# Patient Record
Sex: FEMALE | Race: WHITE | Employment: OTHER | URBAN - METROPOLITAN AREA
[De-identification: names, ages, dates, MRNs, and addresses within clinical notes are randomized per-mention and may not be internally consistent; named-entity substitution may affect disease eponyms.]

---

## 2017-01-03 ENCOUNTER — HOSPITAL ENCOUNTER (OUTPATIENT)
Dept: PHYSICAL THERAPY | Age: 77
Setting detail: THERAPIES SERIES
Discharge: HOME | End: 2017-01-03
Payer: MEDICARE

## 2017-01-03 PROCEDURE — 97110 THERAPEUTIC EXERCISES: CPT

## 2017-01-03 ASSESSMENT — PAIN DESCRIPTION - DESCRIPTORS: DESCRIPTORS: ACHING

## 2017-01-03 ASSESSMENT — PAIN DESCRIPTION - LOCATION: LOCATION: SHOULDER

## 2017-01-03 ASSESSMENT — PAIN SCALES - GENERAL: PAINLEVEL_OUTOF10: 2

## 2017-01-03 ASSESSMENT — PAIN DESCRIPTION - ORIENTATION: ORIENTATION: LEFT

## 2017-01-05 ENCOUNTER — HOSPITAL ENCOUNTER (OUTPATIENT)
Dept: PHYSICAL THERAPY | Age: 77
Setting detail: THERAPIES SERIES
Discharge: HOME | End: 2017-01-05
Payer: MEDICARE

## 2017-01-05 PROCEDURE — 97110 THERAPEUTIC EXERCISES: CPT

## 2017-01-10 ENCOUNTER — HOSPITAL ENCOUNTER (OUTPATIENT)
Dept: PHYSICAL THERAPY | Age: 77
Setting detail: THERAPIES SERIES
Discharge: HOME | End: 2017-01-10
Payer: MEDICARE

## 2017-01-12 ENCOUNTER — HOSPITAL ENCOUNTER (OUTPATIENT)
Dept: ORTHOPEDIC SURGERY | Age: 77
Discharge: HOME | End: 2017-01-12
Payer: MEDICARE

## 2017-01-12 DIAGNOSIS — S22.000D COMPRESSION FRACTURE OF THORACIC VERTEBRA, WITH ROUTINE HEALING, SUBSEQUENT ENCOUNTER: ICD-10-CM

## 2017-01-12 PROCEDURE — 72072 X-RAY EXAM THORAC SPINE 3VWS: CPT

## 2017-01-13 ENCOUNTER — HOSPITAL ENCOUNTER (OUTPATIENT)
Dept: PHYSICAL THERAPY | Age: 77
Setting detail: THERAPIES SERIES
Discharge: HOME | End: 2017-01-13
Payer: MEDICARE

## 2017-01-13 PROCEDURE — G8984 CARRY CURRENT STATUS: HCPCS

## 2017-01-13 PROCEDURE — 97110 THERAPEUTIC EXERCISES: CPT

## 2017-01-13 PROCEDURE — G8985 CARRY GOAL STATUS: HCPCS

## 2017-01-13 ASSESSMENT — PAIN DESCRIPTION - PAIN TYPE: TYPE: ACUTE PAIN

## 2017-01-13 ASSESSMENT — PAIN DESCRIPTION - FREQUENCY: FREQUENCY: INTERMITTENT

## 2017-01-13 ASSESSMENT — PAIN DESCRIPTION - DESCRIPTORS: DESCRIPTORS: SORE

## 2017-01-13 ASSESSMENT — PAIN DESCRIPTION - LOCATION: LOCATION: SHOULDER

## 2017-01-13 ASSESSMENT — PAIN DESCRIPTION - ORIENTATION: ORIENTATION: LEFT

## 2017-01-13 ASSESSMENT — PAIN SCALES - GENERAL: PAINLEVEL_OUTOF10: 3

## 2017-01-17 ENCOUNTER — HOSPITAL ENCOUNTER (OUTPATIENT)
Dept: PHYSICAL THERAPY | Age: 77
Setting detail: THERAPIES SERIES
Discharge: HOME | End: 2017-01-17
Payer: MEDICARE

## 2017-01-19 ENCOUNTER — HOSPITAL ENCOUNTER (OUTPATIENT)
Dept: PHYSICAL THERAPY | Age: 77
Setting detail: THERAPIES SERIES
Discharge: HOME | End: 2017-01-19
Payer: MEDICARE

## 2017-01-19 PROCEDURE — 97110 THERAPEUTIC EXERCISES: CPT

## 2017-01-19 PROCEDURE — 97140 MANUAL THERAPY 1/> REGIONS: CPT

## 2017-01-24 ENCOUNTER — HOSPITAL ENCOUNTER (OUTPATIENT)
Dept: PHYSICAL THERAPY | Age: 77
Setting detail: THERAPIES SERIES
Discharge: HOME | End: 2017-01-24
Payer: MEDICARE

## 2017-01-24 PROCEDURE — 97110 THERAPEUTIC EXERCISES: CPT

## 2017-01-24 PROCEDURE — 97140 MANUAL THERAPY 1/> REGIONS: CPT

## 2017-01-26 ENCOUNTER — HOSPITAL ENCOUNTER (OUTPATIENT)
Dept: PHYSICAL THERAPY | Age: 77
Discharge: HOME | End: 2017-01-26

## 2017-01-27 ENCOUNTER — HOSPITAL ENCOUNTER (OUTPATIENT)
Dept: PHYSICAL THERAPY | Age: 77
Setting detail: THERAPIES SERIES
Discharge: HOME | End: 2017-01-27
Payer: MEDICARE

## 2017-01-27 PROCEDURE — 97110 THERAPEUTIC EXERCISES: CPT

## 2017-01-27 PROCEDURE — 97140 MANUAL THERAPY 1/> REGIONS: CPT

## 2017-01-31 ENCOUNTER — HOSPITAL ENCOUNTER (OUTPATIENT)
Dept: PHYSICAL THERAPY | Age: 77
Discharge: HOME | End: 2017-01-31

## 2017-02-02 ENCOUNTER — HOSPITAL ENCOUNTER (OUTPATIENT)
Dept: PHYSICAL THERAPY | Age: 77
Setting detail: THERAPIES SERIES
Discharge: HOME | End: 2017-02-02
Payer: MEDICARE

## 2017-02-02 PROCEDURE — 97140 MANUAL THERAPY 1/> REGIONS: CPT

## 2017-02-02 PROCEDURE — 97110 THERAPEUTIC EXERCISES: CPT

## 2017-02-07 ENCOUNTER — HOSPITAL ENCOUNTER (OUTPATIENT)
Dept: PHYSICAL THERAPY | Age: 77
Setting detail: THERAPIES SERIES
Discharge: HOME | End: 2017-02-07
Payer: MEDICARE

## 2017-02-07 PROCEDURE — 97110 THERAPEUTIC EXERCISES: CPT

## 2017-02-07 PROCEDURE — 97140 MANUAL THERAPY 1/> REGIONS: CPT

## 2017-02-14 ENCOUNTER — HOSPITAL ENCOUNTER (OUTPATIENT)
Dept: PHYSICAL THERAPY | Age: 77
Discharge: HOME | End: 2017-02-14

## 2017-02-16 ENCOUNTER — HOSPITAL ENCOUNTER (OUTPATIENT)
Dept: PHYSICAL THERAPY | Age: 77
Setting detail: THERAPIES SERIES
Discharge: HOME | End: 2017-02-16
Payer: MEDICARE

## 2017-02-16 PROCEDURE — 97110 THERAPEUTIC EXERCISES: CPT

## 2017-02-16 PROCEDURE — 97140 MANUAL THERAPY 1/> REGIONS: CPT

## 2017-02-21 ENCOUNTER — HOSPITAL ENCOUNTER (OUTPATIENT)
Dept: PHYSICAL THERAPY | Age: 77
Setting detail: THERAPIES SERIES
Discharge: HOME | End: 2017-02-21
Payer: MEDICARE

## 2017-02-21 PROCEDURE — 97110 THERAPEUTIC EXERCISES: CPT

## 2017-02-23 ENCOUNTER — HOSPITAL ENCOUNTER (OUTPATIENT)
Dept: PHYSICAL THERAPY | Age: 77
Setting detail: THERAPIES SERIES
Discharge: HOME | End: 2017-02-23
Payer: MEDICARE

## 2017-02-23 PROCEDURE — 97110 THERAPEUTIC EXERCISES: CPT

## 2017-02-28 ENCOUNTER — HOSPITAL ENCOUNTER (OUTPATIENT)
Dept: PHYSICAL THERAPY | Age: 77
Setting detail: THERAPIES SERIES
Discharge: HOME | End: 2017-02-28
Payer: MEDICARE

## 2017-03-02 ENCOUNTER — HOSPITAL ENCOUNTER (OUTPATIENT)
Dept: PHYSICAL THERAPY | Age: 77
Setting detail: THERAPIES SERIES
Discharge: HOME | End: 2017-03-02
Payer: MEDICARE

## 2017-03-02 PROCEDURE — 97140 MANUAL THERAPY 1/> REGIONS: CPT

## 2017-03-02 PROCEDURE — 97110 THERAPEUTIC EXERCISES: CPT

## 2017-03-02 ASSESSMENT — PAIN DESCRIPTION - DESCRIPTORS: DESCRIPTORS: PRESSURE

## 2017-03-02 ASSESSMENT — PAIN DESCRIPTION - LOCATION: LOCATION: SHOULDER

## 2017-03-02 ASSESSMENT — PAIN DESCRIPTION - ORIENTATION: ORIENTATION: LEFT

## 2017-03-02 ASSESSMENT — PAIN SCALES - GENERAL: PAINLEVEL_OUTOF10: 4

## 2017-05-08 ENCOUNTER — APPOINTMENT (OUTPATIENT)
Dept: GENERAL RADIOLOGY | Age: 77
End: 2017-05-08
Payer: MEDICARE

## 2017-05-08 ENCOUNTER — HOSPITAL ENCOUNTER (EMERGENCY)
Age: 77
Discharge: HOME | End: 2017-05-08
Attending: EMERGENCY MEDICINE
Payer: MEDICARE

## 2017-05-08 VITALS
RESPIRATION RATE: 16 BRPM | TEMPERATURE: 97.8 F | OXYGEN SATURATION: 98 % | HEART RATE: 66 BPM | DIASTOLIC BLOOD PRESSURE: 78 MMHG | SYSTOLIC BLOOD PRESSURE: 168 MMHG | BODY MASS INDEX: 22.62 KG/M2 | WEIGHT: 112 LBS

## 2017-05-08 DIAGNOSIS — S16.1XXA NECK STRAIN, INITIAL ENCOUNTER: Primary | ICD-10-CM

## 2017-05-08 PROCEDURE — 99283 EMERGENCY DEPT VISIT LOW MDM: CPT

## 2017-05-08 PROCEDURE — 6370000000 HC RX 637 (ALT 250 FOR IP): Performed by: EMERGENCY MEDICINE

## 2017-05-08 PROCEDURE — 72072 X-RAY EXAM THORAC SPINE 3VWS: CPT

## 2017-05-08 PROCEDURE — 72050 X-RAY EXAM NECK SPINE 4/5VWS: CPT

## 2017-05-08 RX ORDER — ACETAMINOPHEN 500 MG
1000 TABLET ORAL ONCE
Status: COMPLETED | OUTPATIENT
Start: 2017-05-08 | End: 2017-05-08

## 2017-05-08 RX ADMIN — ACETAMINOPHEN 1000 MG: 500 TABLET ORAL at 16:57

## 2017-05-08 ASSESSMENT — PAIN SCALES - GENERAL
PAINLEVEL_OUTOF10: 9
PAINLEVEL_OUTOF10: 9
PAINLEVEL_OUTOF10: 5

## 2017-05-08 ASSESSMENT — ENCOUNTER SYMPTOMS
SHORTNESS OF BREATH: 0
VOMITING: 0
RHINORRHEA: 0
ABDOMINAL DISTENTION: 0
FACIAL SWELLING: 0
PHOTOPHOBIA: 0
BACK PAIN: 1
EYE DISCHARGE: 0
WHEEZING: 0
ABDOMINAL PAIN: 0
COLOR CHANGE: 0

## 2017-05-08 ASSESSMENT — PAIN DESCRIPTION - ONSET: ONSET: ON-GOING

## 2017-05-08 ASSESSMENT — PAIN DESCRIPTION - DESCRIPTORS
DESCRIPTORS: ACHING
DESCRIPTORS: ACHING

## 2017-05-08 ASSESSMENT — PAIN DESCRIPTION - PAIN TYPE
TYPE: ACUTE PAIN
TYPE: ACUTE PAIN

## 2017-05-08 ASSESSMENT — PAIN DESCRIPTION - ORIENTATION
ORIENTATION: LEFT
ORIENTATION: LEFT

## 2017-05-08 ASSESSMENT — PAIN DESCRIPTION - PROGRESSION: CLINICAL_PROGRESSION: GRADUALLY IMPROVING

## 2017-05-08 ASSESSMENT — PAIN DESCRIPTION - FREQUENCY: FREQUENCY: INTERMITTENT

## 2017-05-08 ASSESSMENT — PAIN DESCRIPTION - LOCATION
LOCATION: NECK
LOCATION: NECK

## 2017-08-30 ENCOUNTER — HOSPITAL ENCOUNTER (OUTPATIENT)
Dept: GENERAL RADIOLOGY | Age: 77
Discharge: HOME | End: 2017-08-30
Payer: MEDICARE

## 2017-08-30 DIAGNOSIS — R52 PAIN: ICD-10-CM

## 2017-08-30 PROCEDURE — 74230 X-RAY XM SWLNG FUNCJ C+: CPT

## 2017-08-30 PROCEDURE — 2500000003 HC RX 250 WO HCPCS: Performed by: RADIOLOGY

## 2017-08-30 RX ADMIN — BARIUM SULFATE 40 ML: 0.81 POWDER, FOR SUSPENSION ORAL at 14:00

## 2017-08-30 RX ADMIN — BARIUM SULFATE 80 ML: 400 SUSPENSION ORAL at 14:00

## 2018-11-10 ENCOUNTER — APPOINTMENT (OUTPATIENT)
Dept: CT IMAGING | Age: 78
DRG: 552 | End: 2018-11-10
Payer: MEDICARE

## 2018-11-10 ENCOUNTER — HOSPITAL ENCOUNTER (INPATIENT)
Age: 78
LOS: 2 days | Discharge: NURSING, REHAB OR LONG-TERM CARE | DRG: 552 | End: 2018-11-14
Attending: EMERGENCY MEDICINE | Admitting: INTERNAL MEDICINE
Payer: MEDICARE

## 2018-11-10 ENCOUNTER — ANESTHESIA EVENT (OUTPATIENT)
Dept: EMERGENCY DEPT | Age: 78
DRG: 552 | End: 2018-11-10
Payer: MEDICARE

## 2018-11-10 ENCOUNTER — ANESTHESIA (OUTPATIENT)
Dept: EMERGENCY DEPT | Age: 78
DRG: 552 | End: 2018-11-10
Payer: MEDICARE

## 2018-11-10 VITALS — DIASTOLIC BLOOD PRESSURE: 91 MMHG | SYSTOLIC BLOOD PRESSURE: 160 MMHG

## 2018-11-10 DIAGNOSIS — G44.89 OTHER HEADACHE SYNDROME: Primary | ICD-10-CM

## 2018-11-10 DIAGNOSIS — I10 ESSENTIAL HYPERTENSION: ICD-10-CM

## 2018-11-10 PROBLEM — R51.9 HA (HEADACHE): Status: ACTIVE | Noted: 2018-11-10

## 2018-11-10 LAB
ALBUMIN SERPL-MCNC: 4.3 G/DL (ref 3.9–4.9)
ALP BLD-CCNC: 96 U/L (ref 40–130)
ALT SERPL-CCNC: <5 U/L (ref 0–33)
ANION GAP SERPL CALCULATED.3IONS-SCNC: 8 MEQ/L (ref 7–13)
APPEARANCE CSF: CLEAR
AST SERPL-CCNC: 26 U/L (ref 0–35)
BACTERIA: NORMAL /HPF
BASOPHILS ABSOLUTE: 0 K/UL (ref 0–0.2)
BASOPHILS RELATIVE PERCENT: 0.6 %
BILIRUB SERPL-MCNC: 0.5 MG/DL (ref 0–1.2)
BILIRUBIN URINE: NEGATIVE
BLOOD, URINE: NEGATIVE
BUN BLDV-MCNC: 23 MG/DL (ref 8–23)
CALCIUM SERPL-MCNC: 9.8 MG/DL (ref 8.6–10.2)
CHLORIDE BLD-SCNC: 105 MEQ/L (ref 98–107)
CLARITY: CLEAR
CLOT EVALUATION CSF: NORMAL
CO2: 30 MEQ/L (ref 22–29)
COLOR CSF: COLORLESS
COLOR: YELLOW
CREAT SERPL-MCNC: 0.61 MG/DL (ref 0.5–0.9)
EKG ATRIAL RATE: 51 BPM
EKG Q-T INTERVAL: 444 MS
EKG QRS DURATION: 84 MS
EKG QTC CALCULATION (BAZETT): 404 MS
EKG R AXIS: -40 DEGREES
EKG T AXIS: 47 DEGREES
EKG VENTRICULAR RATE: 50 BPM
EOSINOPHILS ABSOLUTE: 0 K/UL (ref 0–0.7)
EOSINOPHILS RELATIVE PERCENT: 0.6 %
EPITHELIAL CELLS, UA: NORMAL /HPF
GFR AFRICAN AMERICAN: >60
GFR NON-AFRICAN AMERICAN: >60
GLOBULIN: 2.5 G/DL (ref 2.3–3.5)
GLUCOSE BLD-MCNC: 91 MG/DL (ref 74–109)
GLUCOSE URINE: NEGATIVE MG/DL
GLUCOSE, CSF: 58 MG/DL (ref 50–70)
HCT VFR BLD CALC: 40.6 % (ref 37–47)
HEMOGLOBIN: 14.3 G/DL (ref 12–16)
KETONES, URINE: NEGATIVE MG/DL
LEUKOCYTE ESTERASE, URINE: ABNORMAL
LYMPHOCYTES ABSOLUTE: 0.9 K/UL (ref 1–4.8)
LYMPHOCYTES RELATIVE PERCENT: 13.6 %
MCH RBC QN AUTO: 31.8 PG (ref 27–31.3)
MCHC RBC AUTO-ENTMCNC: 35.3 % (ref 33–37)
MCV RBC AUTO: 90.3 FL (ref 82–100)
MONOCYTES ABSOLUTE: 0.6 K/UL (ref 0.2–0.8)
MONOCYTES RELATIVE PERCENT: 8.9 %
NEUTROPHILS ABSOLUTE: 5.3 K/UL (ref 1.4–6.5)
NEUTROPHILS RELATIVE PERCENT: 76.3 %
NITRITE, URINE: NEGATIVE
NO DIFFERENTIAL CSF: NORMAL
PDW BLD-RTO: 13.6 % (ref 11.5–14.5)
PH UA: 6 (ref 5–9)
PLATELET # BLD: 114 K/UL (ref 130–400)
POTASSIUM SERPL-SCNC: 4.3 MEQ/L (ref 3.5–5.1)
PROTEIN CSF: 32 MG/DL (ref 15–45)
PROTEIN UA: NEGATIVE MG/DL
RBC # BLD: 4.5 M/UL (ref 4.2–5.4)
RBC CSF: 0 K/UL
RBC UA: NORMAL /HPF (ref 0–2)
SODIUM BLD-SCNC: 143 MEQ/L (ref 132–144)
SPECIFIC GRAVITY UA: 1.01 (ref 1–1.03)
TOTAL PROTEIN: 6.8 G/DL (ref 6.4–8.1)
TROPONIN: <0.01 NG/ML (ref 0–0.01)
TUBE NUMBER CSF: NORMAL
URINE REFLEX TO CULTURE: YES
UROBILINOGEN, URINE: 0.2 E.U./DL
VOLUME CSF: 2 ML
WBC # BLD: 7 K/UL (ref 4.8–10.8)
WBC CSF: 0 K/UL (ref 0–8)
WBC UA: NORMAL /HPF (ref 0–5)

## 2018-11-10 PROCEDURE — G0378 HOSPITAL OBSERVATION PER HR: HCPCS

## 2018-11-10 PROCEDURE — 6370000000 HC RX 637 (ALT 250 FOR IP): Performed by: PHYSICIAN ASSISTANT

## 2018-11-10 PROCEDURE — 2580000003 HC RX 258: Performed by: EMERGENCY MEDICINE

## 2018-11-10 PROCEDURE — 87086 URINE CULTURE/COLONY COUNT: CPT

## 2018-11-10 PROCEDURE — 2580000003 HC RX 258: Performed by: PHYSICIAN ASSISTANT

## 2018-11-10 PROCEDURE — 85025 COMPLETE CBC W/AUTO DIFF WBC: CPT

## 2018-11-10 PROCEDURE — 62270 DX LMBR SPI PNXR: CPT

## 2018-11-10 PROCEDURE — 6360000002 HC RX W HCPCS: Performed by: EMERGENCY MEDICINE

## 2018-11-10 PROCEDURE — 99285 EMERGENCY DEPT VISIT HI MDM: CPT

## 2018-11-10 PROCEDURE — 87205 SMEAR GRAM STAIN: CPT

## 2018-11-10 PROCEDURE — 6360000002 HC RX W HCPCS: Performed by: PHYSICIAN ASSISTANT

## 2018-11-10 PROCEDURE — 89050 BODY FLUID CELL COUNT: CPT

## 2018-11-10 PROCEDURE — 6370000000 HC RX 637 (ALT 250 FOR IP): Performed by: EMERGENCY MEDICINE

## 2018-11-10 PROCEDURE — 70450 CT HEAD/BRAIN W/O DYE: CPT

## 2018-11-10 PROCEDURE — 36415 COLL VENOUS BLD VENIPUNCTURE: CPT

## 2018-11-10 PROCEDURE — 93005 ELECTROCARDIOGRAM TRACING: CPT

## 2018-11-10 PROCEDURE — 009U3ZX DRAINAGE OF SPINAL CANAL, PERCUTANEOUS APPROACH, DIAGNOSTIC: ICD-10-PCS | Performed by: EMERGENCY MEDICINE

## 2018-11-10 PROCEDURE — 84157 ASSAY OF PROTEIN OTHER: CPT

## 2018-11-10 PROCEDURE — 82945 GLUCOSE OTHER FLUID: CPT

## 2018-11-10 PROCEDURE — 81001 URINALYSIS AUTO W/SCOPE: CPT

## 2018-11-10 PROCEDURE — 3700000000 HC ANESTHESIA ATTENDED CARE

## 2018-11-10 PROCEDURE — 96374 THER/PROPH/DIAG INJ IV PUSH: CPT

## 2018-11-10 PROCEDURE — 84484 ASSAY OF TROPONIN QUANT: CPT

## 2018-11-10 PROCEDURE — 80053 COMPREHEN METABOLIC PANEL: CPT

## 2018-11-10 PROCEDURE — 87070 CULTURE OTHR SPECIMN AEROBIC: CPT

## 2018-11-10 RX ORDER — FOLIC ACID 1 MG/1
1 TABLET ORAL DAILY
COMMUNITY

## 2018-11-10 RX ORDER — CLONIDINE HYDROCHLORIDE 0.1 MG/1
0.2 TABLET ORAL ONCE
Status: COMPLETED | OUTPATIENT
Start: 2018-11-10 | End: 2018-11-10

## 2018-11-10 RX ORDER — SODIUM CHLORIDE 0.9 % (FLUSH) 0.9 %
10 SYRINGE (ML) INJECTION EVERY 12 HOURS SCHEDULED
Status: DISCONTINUED | OUTPATIENT
Start: 2018-11-10 | End: 2018-11-14 | Stop reason: HOSPADM

## 2018-11-10 RX ORDER — ATENOLOL 25 MG/1
25 TABLET ORAL DAILY
Status: DISCONTINUED | OUTPATIENT
Start: 2018-11-11 | End: 2018-11-11

## 2018-11-10 RX ORDER — LEVOTHYROXINE SODIUM 0.05 MG/1
50 TABLET ORAL DAILY
Status: DISCONTINUED | OUTPATIENT
Start: 2018-11-11 | End: 2018-11-13

## 2018-11-10 RX ORDER — ACETAMINOPHEN 325 MG/1
650 TABLET ORAL EVERY 4 HOURS PRN
Status: DISCONTINUED | OUTPATIENT
Start: 2018-11-10 | End: 2018-11-14 | Stop reason: HOSPADM

## 2018-11-10 RX ORDER — HYDRALAZINE HYDROCHLORIDE 20 MG/ML
5 INJECTION INTRAMUSCULAR; INTRAVENOUS EVERY 6 HOURS PRN
Status: DISCONTINUED | OUTPATIENT
Start: 2018-11-10 | End: 2018-11-14 | Stop reason: HOSPADM

## 2018-11-10 RX ORDER — 0.9 % SODIUM CHLORIDE 0.9 %
500 INTRAVENOUS SOLUTION INTRAVENOUS ONCE
Status: COMPLETED | OUTPATIENT
Start: 2018-11-10 | End: 2018-11-10

## 2018-11-10 RX ORDER — CYCLOBENZAPRINE HCL 5 MG
2.5 TABLET ORAL ONCE
Status: COMPLETED | OUTPATIENT
Start: 2018-11-10 | End: 2018-11-10

## 2018-11-10 RX ORDER — CYCLOBENZAPRINE HCL 5 MG
2.5 TABLET ORAL 3 TIMES DAILY PRN
Status: DISCONTINUED | OUTPATIENT
Start: 2018-11-10 | End: 2018-11-10

## 2018-11-10 RX ORDER — ACETAMINOPHEN 325 MG/1
650 TABLET ORAL EVERY 4 HOURS PRN
Status: DISCONTINUED | OUTPATIENT
Start: 2018-11-10 | End: 2018-11-10

## 2018-11-10 RX ORDER — FOLIC ACID 1 MG/1
1 TABLET ORAL DAILY
Status: DISCONTINUED | OUTPATIENT
Start: 2018-11-11 | End: 2018-11-13

## 2018-11-10 RX ORDER — ONDANSETRON 2 MG/ML
4 INJECTION INTRAMUSCULAR; INTRAVENOUS EVERY 6 HOURS PRN
Status: DISCONTINUED | OUTPATIENT
Start: 2018-11-10 | End: 2018-11-14 | Stop reason: HOSPADM

## 2018-11-10 RX ORDER — SODIUM CHLORIDE 0.9 % (FLUSH) 0.9 %
10 SYRINGE (ML) INJECTION PRN
Status: DISCONTINUED | OUTPATIENT
Start: 2018-11-10 | End: 2018-11-14 | Stop reason: HOSPADM

## 2018-11-10 RX ORDER — LIDOCAINE 4 G/G
2 PATCH TOPICAL DAILY
Status: DISCONTINUED | OUTPATIENT
Start: 2018-11-11 | End: 2018-11-10

## 2018-11-10 RX ORDER — ATROPINE SULFATE 0.1 MG/ML
0.5 INJECTION INTRAVENOUS ONCE
Status: COMPLETED | OUTPATIENT
Start: 2018-11-10 | End: 2018-11-10

## 2018-11-10 RX ORDER — KETOROLAC TROMETHAMINE 15 MG/ML
15 INJECTION, SOLUTION INTRAMUSCULAR; INTRAVENOUS ONCE
Status: COMPLETED | OUTPATIENT
Start: 2018-11-10 | End: 2018-11-10

## 2018-11-10 RX ORDER — LIDOCAINE 4 G/G
2 PATCH TOPICAL DAILY
Status: DISCONTINUED | OUTPATIENT
Start: 2018-11-10 | End: 2018-11-14 | Stop reason: HOSPADM

## 2018-11-10 RX ORDER — KETOROLAC TROMETHAMINE 15 MG/ML
15 INJECTION, SOLUTION INTRAMUSCULAR; INTRAVENOUS EVERY 6 HOURS PRN
Status: DISCONTINUED | OUTPATIENT
Start: 2018-11-10 | End: 2018-11-14 | Stop reason: HOSPADM

## 2018-11-10 RX ORDER — AMLODIPINE BESYLATE 5 MG/1
5 TABLET ORAL DAILY
Status: DISCONTINUED | OUTPATIENT
Start: 2018-11-11 | End: 2018-11-13

## 2018-11-10 RX ADMIN — SODIUM CHLORIDE 500 ML: 9 INJECTION, SOLUTION INTRAVENOUS at 18:45

## 2018-11-10 RX ADMIN — ACETAMINOPHEN 650 MG: 325 TABLET ORAL at 20:50

## 2018-11-10 RX ADMIN — CLONIDINE HYDROCHLORIDE 0.2 MG: 0.1 TABLET ORAL at 15:36

## 2018-11-10 RX ADMIN — Medication 10 ML: at 21:35

## 2018-11-10 RX ADMIN — CYCLOBENZAPRINE HYDROCHLORIDE 2.5 MG: 5 TABLET, FILM COATED ORAL at 21:33

## 2018-11-10 RX ADMIN — KETOROLAC TROMETHAMINE 15 MG: 15 INJECTION INTRAMUSCULAR; INTRAVENOUS at 21:33

## 2018-11-10 RX ADMIN — ATROPINE SULFATE 0.5 MG: 0.1 INJECTION, SOLUTION ENDOTRACHEAL; INTRAMUSCULAR; INTRAVENOUS; SUBCUTANEOUS at 18:45

## 2018-11-10 RX ADMIN — CARBIDOPA AND LEVODOPA 1 TABLET: 25; 100 TABLET ORAL at 22:26

## 2018-11-10 ASSESSMENT — ENCOUNTER SYMPTOMS
VOICE CHANGE: 0
NAUSEA: 0
CHEST TIGHTNESS: 0
SINUS PRESSURE: 0
SHORTNESS OF BREATH: 0
EYE REDNESS: 0
DIARRHEA: 0
COUGH: 0
STRIDOR: 0
EYE DISCHARGE: 0
PHOTOPHOBIA: 0
CHOKING: 0
EYE PAIN: 0
EYE ITCHING: 0
ABDOMINAL DISTENTION: 0
ABDOMINAL PAIN: 0
ANAL BLEEDING: 0
CONSTIPATION: 0
BLOOD IN STOOL: 0
FACIAL SWELLING: 0
COLOR CHANGE: 0
TROUBLE SWALLOWING: 0
RHINORRHEA: 0
VOMITING: 0
SORE THROAT: 0
WHEEZING: 0
BACK PAIN: 0

## 2018-11-10 ASSESSMENT — PAIN DESCRIPTION - PAIN TYPE: TYPE: ACUTE PAIN

## 2018-11-10 ASSESSMENT — PAIN DESCRIPTION - LOCATION
LOCATION: HEAD;NECK;GENERALIZED
LOCATION: HEAD

## 2018-11-10 ASSESSMENT — PAIN DESCRIPTION - FREQUENCY
FREQUENCY: CONTINUOUS
FREQUENCY: CONTINUOUS

## 2018-11-10 ASSESSMENT — PAIN DESCRIPTION - DESCRIPTORS
DESCRIPTORS: HEADACHE
DESCRIPTORS: DISCOMFORT

## 2018-11-10 ASSESSMENT — PAIN SCALES - GENERAL
PAINLEVEL_OUTOF10: 7
PAINLEVEL_OUTOF10: 7
PAINLEVEL_OUTOF10: 10

## 2018-11-10 ASSESSMENT — PAIN DESCRIPTION - PROGRESSION: CLINICAL_PROGRESSION: NOT CHANGED

## 2018-11-10 ASSESSMENT — PAIN DESCRIPTION - DIRECTION: RADIATING_TOWARDS: DOWN NECK

## 2018-11-10 ASSESSMENT — PAIN DESCRIPTION - ONSET: ONSET: ON-GOING

## 2018-11-10 NOTE — ANESTHESIA PRE PROCEDURE
Problem List:  There is no problem list on file for this patient. Past Medical History:        Diagnosis Date    Closed left arm fracture 09/23/2016    COPD (chronic obstructive pulmonary disease) (HCC)     Falls frequently     GERD (gastroesophageal reflux disease)     Hypertension     Mitral valve prolapse     Thyroid disease        Past Surgical History:        Procedure Laterality Date    APPENDECTOMY      CHOLECYSTECTOMY      HYSTERECTOMY      TONSILLECTOMY      UPPER GASTROINTESTINAL ENDOSCOPY  2/11/16     DR. MEZA        Social History:    Social History   Substance Use Topics    Smoking status: Former Smoker     Quit date: 10/1/1992    Smokeless tobacco: Not on file    Alcohol use Not on file                                Counseling given: Not Answered      Vital Signs (Current):   Vitals:    11/10/18 1531 11/10/18 1700 11/10/18 1743 11/10/18 1758   BP: (!) 184/79  (!) 122/55 (!) 163/60   Pulse: 50  (!) 46 (!) 49   Resp: 16  15 18   Temp:       TempSrc:       SpO2: 98% 96% 98% 98%   Weight:                                                  BP Readings from Last 3 Encounters:   11/10/18 (!) 163/60   11/10/18 (!) 160/91   05/08/17 (!) 168/78       NPO Status:                                                                                 BMI:   Wt Readings from Last 3 Encounters:   11/10/18 100 lb (45.4 kg)   05/08/17 112 lb (50.8 kg)   10/01/16 105 lb (47.6 kg)     Body mass index is 20.2 kg/m².     CBC:   Lab Results   Component Value Date    WBC 7.0 11/10/2018    RBC 4.50 11/10/2018    RBC 4.80 01/07/2012    HGB 14.3 11/10/2018    HCT 40.6 11/10/2018    MCV 90.3 11/10/2018    RDW 13.6 11/10/2018     11/10/2018       CMP:   Lab Results   Component Value Date     11/10/2018    K 4.3 11/10/2018     11/10/2018    CO2 30 11/10/2018    BUN 23 11/10/2018    CREATININE 0.61 11/10/2018    GFRAA >60.0 11/10/2018    LABGLOM >60.0 11/10/2018    GLUCOSE 91 11/10/2018

## 2018-11-10 NOTE — ANESTHESIA PROCEDURE NOTES
Spinal Block    Patient location during procedure: OB  Start time: 11/10/2018 6:06 PM  End time: 11/10/2018 6:10 PM  Reason for block: at surgeon's request  Staffing  Resident/CRNA: German Childers  Performed: resident/CRNA   Preanesthetic Checklist  Completed: patient identified, site marked, surgical consent, pre-op evaluation, timeout performed, IV checked, risks and benefits discussed, monitors and equipment checked, anesthesia consent given, oxygen available and patient being monitored  Spinal Block  Patient position: sitting  Prep: Betadine, site prepped and draped and x3  Patient monitoring: cardiac monitor, continuous pulse ox and frequent blood pressure checks  Approach: midline  Location: L2/L3  Provider prep: mask and sterile gloves  Local infiltration: lidocaine  Needle  Needle type: Jonny   Needle gauge: 22 G  Needle length: 3.5 in  Needle insertion depth: 6 cm  Kit: Dural puncture kit  Assessment  Swirl obtained: Yes  CSF: clear  Attempts: 1  Hemodynamics: stable  Additional Notes  Lumbar puncture in ED for headache. CSF accessed with #22 jonny. Approx 9cc total CSF collected in three tubes.  Patient tolerated procedure very well and is resting comfortably in rm 23 with family present at bedside

## 2018-11-10 NOTE — ED NOTES
Pt HR dropped down to 39. Pt placed on pacer pads and monitor. No s/sym of distress. No further questions or concern from PT. IV line infiltrated and another line placed. Call light in reach. Will con't to monitor. Family at bedside.       Collins Chin, CRISTY  11/10/18 [id number]

## 2018-11-10 NOTE — ED PROVIDER NOTES
[address] [facility] ED  eMERGENCY dEPARTMENT eNCOUnter      Pt Name: Sean Antonio  MRN: [id number]  [place] 1940  Date of evaluation: 11/10/2018  Provider: Natalie Rubinstein, MD    CHIEF COMPLAINT       Chief Complaint   Patient presents with    Headache     neck pains-since yest-denies injury         HISTORY OF PRESENT ILLNESS   (Location/Symptom, Timing/Onset, Context/Setting, Quality, Duration, Modifying Factors, Severity)  Note limiting factors. Sean Antonio is a 70 y.o. female who presents to the emergency department With the worst headache of her life which radiates down both sides of her neck posteriorly. The onset of the symptoms were yesterday. Context and setting: This happened at home. Quality pain: Sharp. She states the pain is all over her head but primarily in the occiput. Modifying factors the patient denies nausea vomiting diarrhea or any other associated symptoms. Patient's had no prior treatment for this condition. Thing makes the headache better or worse. The patient has difficulty moving her neck because of the pain. Severity moderate to severe. HPI    Nursing Notes were reviewed. Review of Systems   Constitutional: Negative for appetite change, chills, diaphoresis, fatigue and fever. HENT: Negative for congestion, dental problem, ear discharge, ear pain, facial swelling, hearing loss, mouth sores, nosebleeds, postnasal drip, rhinorrhea, sinus pressure, sneezing, sore throat, tinnitus, trouble swallowing and voice change. Eyes: Negative for photophobia, pain, discharge, redness, itching and visual disturbance. Respiratory: Negative for cough, choking, chest tightness, shortness of breath, wheezing and stridor. Cardiovascular: Negative for chest pain, palpitations and leg swelling. Gastrointestinal: Negative for abdominal distention, abdominal pain, anal bleeding, blood in stool, constipation, diarrhea, nausea and vomiting.    Endocrine: Negative for cold intolerance, heat intolerance, polydipsia and polyphagia. Genitourinary: Negative for decreased urine volume, difficulty urinating, dysuria, enuresis, flank pain, frequency, genital sores, hematuria and urgency. Musculoskeletal: Positive for neck pain and neck stiffness. Negative for arthralgias, back pain, gait problem, joint swelling and myalgias. Skin: Negative for color change, pallor, rash and wound. Allergic/Immunologic: Negative for environmental allergies and food allergies. Neurological: Positive for headaches. Negative for dizziness, tremors, seizures, syncope, facial asymmetry, speech difficulty, weakness, light-headedness and numbness. Hematological: Negative for adenopathy. Does not bruise/bleed easily. Psychiatric/Behavioral: Negative for agitation, behavioral problems, confusion, decreased concentration, dysphoric mood, hallucinations, self-injury, sleep disturbance and suicidal ideas. The patient is not nervous/anxious and is not hyperactive. REVIEW OF SYSTEMS    (2-9 systems for level 4, 10 or more for level 5)     Physical Exam   Constitutional: She is oriented to person, place, and time. She appears well-developed and well-nourished. No distress. HENT:   Head: Normocephalic and atraumatic. Right Ear: External ear normal.   Left Ear: External ear normal.   Nose: Nose normal.   Mouth/Throat: Oropharynx is clear and moist. No oropharyngeal exudate. Eyes: Pupils are equal, round, and reactive to light. Conjunctivae and EOM are normal. Right eye exhibits no discharge. Left eye exhibits no discharge. No scleral icterus. Neck: Neck supple. No JVD present. No tracheal deviation present. No thyromegaly present. Patient has limited range of motion in all directions. Cardiovascular: Normal rate, regular rhythm, normal heart sounds and intact distal pulses. Exam reveals no gallop and no friction rub. No murmur heard.   Pulmonary/Chest: Effort normal and breath sounds normal. No stridor. No respiratory distress. She has no wheezes. She has no rales. She exhibits no tenderness. Abdominal: Soft. Bowel sounds are normal. She exhibits no distension and no mass. There is no tenderness. There is no rebound and no guarding. Musculoskeletal: Normal range of motion. She exhibits no edema or tenderness. Patient has significantly decreased range of motion of her neck. All other limbs were palpated and are completely nontender the patient has no pedal edema. Lymphadenopathy:     She has no cervical adenopathy. Neurological: She is alert and oriented to person, place, and time. She has normal reflexes. She displays normal reflexes. No cranial nerve deficit. She exhibits normal muscle tone. Coordination normal.   Skin: Skin is warm and dry. No rash noted. She is not diaphoretic. No erythema. No pallor. Psychiatric: She has a normal mood and affect. Her behavior is normal. Judgment and thought content normal.   Nursing note and vitals reviewed. Except as noted above the remainder of the review of systems was reviewed and negative. PAST MEDICAL HISTORY     Past Medical History:   Diagnosis Date    Closed left arm fracture 09/23/2016    COPD (chronic obstructive pulmonary disease) ([facility] [address]     Falls frequently     GERD (gastroesophageal reflux disease)     Hypertension     Mitral valve prolapse     Thyroid disease          SURGICAL HISTORY       Past Surgical History:   Procedure Laterality Date    APPENDECTOMY      CHOLECYSTECTOMY      HYSTERECTOMY      TONSILLECTOMY      UPPER GASTROINTESTINAL ENDOSCOPY  2/11/16     DR. MEZA          CURRENT MEDICATIONS       Previous Medications    ALBUTEROL SULFATE  (90 BASE) MCG/ACT INHALER    Inhale 2 puffs into the lungs every 6 hours as needed for Wheezing    AMLODIPINE (NORVASC) 5 MG TABLET    Take 5 mg by mouth daily    ATENOLOL (TENORMIN) 25 MG TABLET    Take 25 mg by mouth daily    CEPHALEXIN (KEFLEX) 500 MG CAPSULE    Take 1 capsule by mouth 4 times daily    LEVOTHYROXINE (SYNTHROID) 50 MCG TABLET    Take 50 mcg by mouth Daily    TRAMADOL (ULTRAM) 50 MG TABLET    Take 1 tablet by mouth every 8 hours as needed for Pain       ALLERGIES     Actonel [risedronate sodium]; Carafate [sucralfate]; Codeine; Darvon [propoxyphene]; Estrogens; Ranitidine; and Reglan [metoclopramide]    FAMILY HISTORY     History reviewed. No pertinent family history. SOCIAL HISTORY       Social History     Social History    Marital status:      Spouse name: N/A    Number of children: N/A    Years of education: N/A     Social History Main Topics    Smoking status: Former Smoker     Quit date: 10/1/1992    Smokeless tobacco: None    Alcohol use None    Drug use: Unknown    Sexual activity: Not Asked     Other Topics Concern    None     Social History Narrative    None       SCREENINGS    [place] Coma Scale  Eye Opening: Spontaneous  Best Verbal Response: Oriented  Best Motor Response: Obeys commands  [place] Coma Scale Score: 15        PHYSICAL EXAM    (up to 7 for level 4, 8 or more for level 5)     ED Triage Vitals [11/10/18 1318]   BP Temp Temp Source Pulse Resp SpO2 Height Weight   (!) 204/87 97.7 °F (36.5 °C) Temporal 52 14 98 % -- 100 lb (45.4 kg)       See the physical exam documented above    DIAGNOSTIC RESULTS     EKG: All EKG's are interpreted by the Emergency Department Physician who either signs or Co-signs this chart in the absence of a cardiologist.    12-lead EKG was performed which shows a junctional rhythm with a rate of 50 bpm.  There is left axis deviation. There is no ST segment elevation or depression in any limb lead or productive corneal lead. The QT C interval is normal at 404.   Summary: Abnormal EKG without acute findings of a STEMI MI.    RADIOLOGY:   Non-plain film images such as CT, Ultrasound and MRI are read by the radiologist. Plain radiographic images are visualized and preliminarily 11/10/18 1758 11/10/18 1847 11/10/18 1848 11/10/18 1921   BP: (!) 163/60 (!) 149/58  (!) 186/69   Pulse: (!) 49 (!) 49 64 60   Resp: 18 16  16   Temp:       TempSrc:       SpO2: 98% 98%  97%   Weight:           All the hospitalist who insisted the patient have an LP. I attempted to do an LP and was unsuccessful. I contacted Dr. Kenton Monroy who was unable to come in he suggested Dr. Coby Rosales. Dr. Nasrin Chen is on today and he suggested the anesthesiologist.  Max Heard, the CRNA came to the ER. And did the LP. The lab results show no red cells in the spinal fluid. The patient will be admitted to the service of the hospitalist with a diagnosis of cephalgia    MDM      CRITICAL CARE TIME     CONSULTS:  None    PROCEDURES:  Unless otherwise noted below, none     Lumbar Puncture  Date/Time: 11/10/2018 7:56 PM  Performed by: Bc Saha by: Akila Bingham     Consent:     Consent obtained:  Written    Consent given by:  Patient    Risks discussed:  Bleeding, infection, pain and repeat procedure    Alternatives discussed:  No treatment  Pre-procedure details:     Procedure purpose:  Diagnostic  Anesthesia (see MAR for exact dosages): Anesthesia method:  Local infiltration    Local anesthetic:  Lidocaine 1% w/o epi  Procedure details:     Lumbar space:  L4-L5 interspace    Patient position:  L lateral decubitus    Needle gauge:  20    Needle type:  Alois Pass point    Needle length (in):  3.5    Ultrasound guidance: no      Number of attempts:  5 or more    Total volume (ml):  0  Post-procedure:     Puncture site:  Adhesive bandage applied    Patient tolerance of procedure: Tolerated well, no immediate complications  Comments:      No spinal fluid was obtained with multiple attempts. FINAL IMPRESSION      1. Other headache syndrome    2.  Essential hypertension          DISPOSITION/PLAN   DISPOSITION Decision To Admit 11/10/2018 03:44:49 PM      PATIENT REFERRED TO:  No follow-up provider

## 2018-11-11 LAB
ANION GAP SERPL CALCULATED.3IONS-SCNC: 10 MEQ/L (ref 7–13)
BUN BLDV-MCNC: 22 MG/DL (ref 8–23)
C-REACTIVE PROTEIN: 3.5 MG/L (ref 0–5)
CALCIUM SERPL-MCNC: 8.8 MG/DL (ref 8.6–10.2)
CHLORIDE BLD-SCNC: 106 MEQ/L (ref 98–107)
CO2: 26 MEQ/L (ref 22–29)
CREAT SERPL-MCNC: 0.73 MG/DL (ref 0.5–0.9)
GFR AFRICAN AMERICAN: >60
GFR NON-AFRICAN AMERICAN: >60
GLUCOSE BLD-MCNC: 82 MG/DL (ref 74–109)
HCT VFR BLD CALC: 33.1 % (ref 37–47)
HEMOGLOBIN: 11.5 G/DL (ref 12–16)
MCH RBC QN AUTO: 31.8 PG (ref 27–31.3)
MCHC RBC AUTO-ENTMCNC: 34.8 % (ref 33–37)
MCV RBC AUTO: 91.4 FL (ref 82–100)
PDW BLD-RTO: 13.5 % (ref 11.5–14.5)
PLATELET # BLD: 110 K/UL (ref 130–400)
POTASSIUM REFLEX MAGNESIUM: 3.7 MEQ/L (ref 3.5–5.1)
RBC # BLD: 3.62 M/UL (ref 4.2–5.4)
SEDIMENTATION RATE, ERYTHROCYTE: 24 MM (ref 0–30)
SODIUM BLD-SCNC: 142 MEQ/L (ref 132–144)
WBC # BLD: 4.8 K/UL (ref 4.8–10.8)

## 2018-11-11 PROCEDURE — G0378 HOSPITAL OBSERVATION PER HR: HCPCS

## 2018-11-11 PROCEDURE — 85027 COMPLETE CBC AUTOMATED: CPT

## 2018-11-11 PROCEDURE — 6360000002 HC RX W HCPCS: Performed by: PHYSICIAN ASSISTANT

## 2018-11-11 PROCEDURE — 6370000000 HC RX 637 (ALT 250 FOR IP): Performed by: INTERNAL MEDICINE

## 2018-11-11 PROCEDURE — 6360000002 HC RX W HCPCS: Performed by: INTERNAL MEDICINE

## 2018-11-11 PROCEDURE — 6370000000 HC RX 637 (ALT 250 FOR IP): Performed by: PHYSICIAN ASSISTANT

## 2018-11-11 PROCEDURE — 86140 C-REACTIVE PROTEIN: CPT

## 2018-11-11 PROCEDURE — 80048 BASIC METABOLIC PNL TOTAL CA: CPT

## 2018-11-11 PROCEDURE — 96372 THER/PROPH/DIAG INJ SC/IM: CPT

## 2018-11-11 PROCEDURE — 36415 COLL VENOUS BLD VENIPUNCTURE: CPT

## 2018-11-11 PROCEDURE — 2580000003 HC RX 258: Performed by: PHYSICIAN ASSISTANT

## 2018-11-11 PROCEDURE — 85652 RBC SED RATE AUTOMATED: CPT

## 2018-11-11 RX ORDER — GABAPENTIN 100 MG/1
100 CAPSULE ORAL 2 TIMES DAILY
Status: DISCONTINUED | OUTPATIENT
Start: 2018-11-11 | End: 2018-11-12

## 2018-11-11 RX ORDER — TIZANIDINE 2 MG/1
2 TABLET ORAL 3 TIMES DAILY PRN
Status: DISCONTINUED | OUTPATIENT
Start: 2018-11-11 | End: 2018-11-14 | Stop reason: HOSPADM

## 2018-11-11 RX ORDER — METHYLPREDNISOLONE 4 MG/1
4 TABLET ORAL DAILY
Status: DISCONTINUED | OUTPATIENT
Start: 2018-11-11 | End: 2018-11-14 | Stop reason: HOSPADM

## 2018-11-11 RX ADMIN — ATENOLOL 25 MG: 25 TABLET ORAL at 09:50

## 2018-11-11 RX ADMIN — CARBIDOPA AND LEVODOPA 1 TABLET: 25; 100 TABLET ORAL at 09:50

## 2018-11-11 RX ADMIN — CARBIDOPA AND LEVODOPA 1 TABLET: 25; 100 TABLET ORAL at 17:38

## 2018-11-11 RX ADMIN — Medication 10 ML: at 09:50

## 2018-11-11 RX ADMIN — CARBIDOPA AND LEVODOPA 1 TABLET: 25; 100 TABLET ORAL at 22:36

## 2018-11-11 RX ADMIN — GABAPENTIN 100 MG: 100 CAPSULE ORAL at 22:32

## 2018-11-11 RX ADMIN — METHYLPREDNISOLONE 4 MG: 4 TABLET ORAL at 17:40

## 2018-11-11 RX ADMIN — FOLIC ACID 1 MG: 1 TABLET ORAL at 09:51

## 2018-11-11 RX ADMIN — AMLODIPINE BESYLATE 5 MG: 5 TABLET ORAL at 09:50

## 2018-11-11 RX ADMIN — Medication 10 ML: at 22:32

## 2018-11-11 RX ADMIN — ENOXAPARIN SODIUM 40 MG: 40 INJECTION SUBCUTANEOUS at 09:43

## 2018-11-11 RX ADMIN — LEVOTHYROXINE SODIUM 50 MCG: 0.05 TABLET ORAL at 06:24

## 2018-11-11 RX ADMIN — ACETAMINOPHEN 650 MG: 325 TABLET ORAL at 22:32

## 2018-11-11 RX ADMIN — CARBIDOPA AND LEVODOPA 1 TABLET: 25; 100 TABLET ORAL at 13:44

## 2018-11-11 ASSESSMENT — PAIN SCALES - GENERAL
PAINLEVEL_OUTOF10: 0
PAINLEVEL_OUTOF10: 0
PAINLEVEL_OUTOF10: 2

## 2018-11-11 NOTE — FLOWSHEET NOTE
Pt heart rate is now 47 up from 38. Pt stated \"my heart rate is normally in the 60's. \"  Pt is up in chair visiting with son and shows no sign of distress. Will continue to monitor.

## 2018-11-11 NOTE — PROGRESS NOTES
GLUCOSE 78 01/07/2012    MG 2.3 12/27/2014    CALCIUM 8.8 11/11/2018     Lab Results   Component Value Date    WBC 4.8 11/11/2018    RBC 3.62 11/11/2018    RBC 4.80 01/07/2012    HGB 11.5 11/11/2018    HCT 33.1 11/11/2018    MCV 91.4 11/11/2018    RDW 13.5 11/11/2018     11/11/2018     Lab Results   Component Value Date    INR 1.0 12/27/2014    PROTIME 10.4 12/27/2014     No results found for: ORG    RADIOLOGY:  Ct Head Wo Contrast    Result Date: 11/10/2018  CT HEAD WO CONTRAST CLINICAL HISTORY:  Worst headache of life COMPARISON: October 2, 2016 TECHNIQUE: Multiple unenhanced serial axial images of the brain from the vertex of the skull to the base of the skull were performed. FINDINGS: The ventricles are dilated. This is compensatory to the surrounding moderate generalized parenchymal volume loss. No mass. No midline shift. The cisterns are patent. There are white matter and periventricular changes most likely consistent with chronic small vessel disease. No acute intra-axial or extra-axial findings. The visualized osseous structures are unremarkable. The visualized portion of the paranasal sinuses again show trace free fluid in the right-sided sphenoid. Unchanged. The mastoids are unremarkable. NO ACUTE INTRA-AXIAL OR EXTRA-AXIAL FINDINGS. IF SIGNS OR SYMPTOMS PERSIST THEN CONSIDER REPEAT CT SCAN IN 12 TO 24 HOURS OR MRI TO FURTHER EVALUATE IF THERE ARE NO CONTRAINDICATIONS All CT scans at this facility use dose modulation, iterative reconstruction, and/or weight based dosing when appropriate to reduce radiation dose to as low as reasonably achievable. Asesment and Plan :  . Headache. Ct head is neg. LP is neg. She also c/o cervical pain and neck stiffness. Suspect cervical disc disease and we need to r/o spinal stenosis. Denied recent falls  . Severe bradycardia. Pt stated she feels dizzy sometimes. jay 2nd BB which is stopped  . HTN. Better controlled today  .  Parkinson and she follows with dr. Buddy Castro at [facility] [place]    Recommendations:  Dc atenolol and monitor HR  Try lidoderm patch, tizanedine  Short use of gabapenting  Get cervical mri  Get esr and crp  Will try medrol sara  Consult with neurology  Pt/ot    Diamond Payne MD    Electronically signed by Diamond Payne MD on 11/11/18 at 3:12 PM

## 2018-11-11 NOTE — PLAN OF CARE
Problem: Pain:  Goal: Pain level will decrease  Pain level will decrease   Outcome: Ongoing  Patients pain will be addressed, and documented. Goal: Control of acute pain  Control of acute pain   Outcome: Ongoing  Will medicate with tylenol, flexeril, toradol  As needed     Problem: Falls - Risk of: Intervention: Assess risk factors for falls  Patient high risk for falls.  Bed alarm, yellow wrist band and socks applied     Goal: Will remain free from falls  Will remain free from falls   Outcome: Ongoing  Falls precautions implemented

## 2018-11-12 ENCOUNTER — APPOINTMENT (OUTPATIENT)
Dept: MRI IMAGING | Age: 78
DRG: 552 | End: 2018-11-12
Payer: MEDICARE

## 2018-11-12 PROBLEM — E44.0 MODERATE MALNUTRITION (HCC): Status: ACTIVE | Noted: 2018-11-12

## 2018-11-12 PROBLEM — R27.0 ATAXIA: Status: ACTIVE | Noted: 2018-11-12

## 2018-11-12 LAB
FOLATE: >20 NG/ML (ref 7.3–26.1)
TSH SERPL DL<=0.05 MIU/L-ACNC: 1.37 UIU/ML (ref 0.27–4.2)
URINE CULTURE, ROUTINE: NORMAL
VITAMIN B-12: 608 PG/ML (ref 232–1245)
VITAMIN D 25-HYDROXY: 14.7 NG/ML (ref 30–100)

## 2018-11-12 PROCEDURE — 97162 PT EVAL MOD COMPLEX 30 MIN: CPT

## 2018-11-12 PROCEDURE — 82746 ASSAY OF FOLIC ACID SERUM: CPT

## 2018-11-12 PROCEDURE — 6370000000 HC RX 637 (ALT 250 FOR IP): Performed by: PHYSICIAN ASSISTANT

## 2018-11-12 PROCEDURE — G0378 HOSPITAL OBSERVATION PER HR: HCPCS

## 2018-11-12 PROCEDURE — 84443 ASSAY THYROID STIM HORMONE: CPT

## 2018-11-12 PROCEDURE — 82607 VITAMIN B-12: CPT

## 2018-11-12 PROCEDURE — G8978 MOBILITY CURRENT STATUS: HCPCS

## 2018-11-12 PROCEDURE — 6360000002 HC RX W HCPCS: Performed by: PHYSICIAN ASSISTANT

## 2018-11-12 PROCEDURE — 96372 THER/PROPH/DIAG INJ SC/IM: CPT

## 2018-11-12 PROCEDURE — 2580000003 HC RX 258: Performed by: PHYSICIAN ASSISTANT

## 2018-11-12 PROCEDURE — 82306 VITAMIN D 25 HYDROXY: CPT

## 2018-11-12 PROCEDURE — 1210000000 HC MED SURG R&B

## 2018-11-12 PROCEDURE — 72141 MRI NECK SPINE W/O DYE: CPT

## 2018-11-12 PROCEDURE — 93010 ELECTROCARDIOGRAM REPORT: CPT | Performed by: INTERNAL MEDICINE

## 2018-11-12 PROCEDURE — 36415 COLL VENOUS BLD VENIPUNCTURE: CPT

## 2018-11-12 PROCEDURE — G8979 MOBILITY GOAL STATUS: HCPCS

## 2018-11-12 PROCEDURE — 6370000000 HC RX 637 (ALT 250 FOR IP): Performed by: INTERNAL MEDICINE

## 2018-11-12 PROCEDURE — 96375 TX/PRO/DX INJ NEW DRUG ADDON: CPT

## 2018-11-12 PROCEDURE — 6360000002 HC RX W HCPCS: Performed by: INTERNAL MEDICINE

## 2018-11-12 RX ORDER — TIZANIDINE 2 MG/1
2 TABLET ORAL 3 TIMES DAILY PRN
Qty: 20 TABLET | Refills: 0 | Status: SHIPPED | OUTPATIENT
Start: 2018-11-12

## 2018-11-12 RX ORDER — METHYLPREDNISOLONE 4 MG/1
TABLET ORAL
Qty: 1 KIT | Refills: 0 | Status: SHIPPED | OUTPATIENT
Start: 2018-11-12 | End: 2018-11-18

## 2018-11-12 RX ORDER — ERGOCALCIFEROL 1.25 MG/1
50000 CAPSULE ORAL WEEKLY
Status: DISCONTINUED | OUTPATIENT
Start: 2018-11-12 | End: 2018-11-14 | Stop reason: HOSPADM

## 2018-11-12 RX ORDER — ERGOCALCIFEROL (VITAMIN D2) 1250 MCG
50000 CAPSULE ORAL WEEKLY
Qty: 8 CAPSULE | Refills: 0 | Status: SHIPPED | OUTPATIENT
Start: 2018-11-12 | End: 2019-01-01

## 2018-11-12 RX ORDER — LIDOCAINE 4 G/G
2 PATCH TOPICAL DAILY
Qty: 10 PATCH | Refills: 0 | Status: SHIPPED | OUTPATIENT
Start: 2018-11-12

## 2018-11-12 RX ORDER — GABAPENTIN 100 MG/1
100 CAPSULE ORAL 2 TIMES DAILY
Qty: 20 CAPSULE | Refills: 0 | Status: SHIPPED | OUTPATIENT
Start: 2018-11-12 | End: 2018-11-22

## 2018-11-12 RX ORDER — GABAPENTIN 100 MG/1
100 CAPSULE ORAL NIGHTLY
Status: DISCONTINUED | OUTPATIENT
Start: 2018-11-13 | End: 2018-11-14 | Stop reason: HOSPADM

## 2018-11-12 RX ADMIN — CARBIDOPA AND LEVODOPA 1 TABLET: 25; 100 TABLET ORAL at 17:22

## 2018-11-12 RX ADMIN — CARBIDOPA AND LEVODOPA 1 TABLET: 25; 100 TABLET ORAL at 09:22

## 2018-11-12 RX ADMIN — LEVOTHYROXINE SODIUM 50 MCG: 0.05 TABLET ORAL at 09:22

## 2018-11-12 RX ADMIN — CARBIDOPA AND LEVODOPA 1 TABLET: 25; 100 TABLET ORAL at 13:14

## 2018-11-12 RX ADMIN — Medication 10 ML: at 22:07

## 2018-11-12 RX ADMIN — CARBIDOPA AND LEVODOPA 1 TABLET: 25; 100 TABLET ORAL at 21:58

## 2018-11-12 RX ADMIN — KETOROLAC TROMETHAMINE 15 MG: 15 INJECTION, SOLUTION INTRAMUSCULAR; INTRAVENOUS at 13:14

## 2018-11-12 RX ADMIN — ENOXAPARIN SODIUM 40 MG: 40 INJECTION SUBCUTANEOUS at 09:22

## 2018-11-12 RX ADMIN — Medication 10 ML: at 13:14

## 2018-11-12 RX ADMIN — FOLIC ACID 1 MG: 1 TABLET ORAL at 09:22

## 2018-11-12 RX ADMIN — AMLODIPINE BESYLATE 5 MG: 5 TABLET ORAL at 09:22

## 2018-11-12 RX ADMIN — GABAPENTIN 100 MG: 100 CAPSULE ORAL at 09:23

## 2018-11-12 RX ADMIN — ERGOCALCIFEROL 50000 UNITS: 1.25 CAPSULE ORAL at 11:48

## 2018-11-12 RX ADMIN — METHYLPREDNISOLONE 4 MG: 4 TABLET ORAL at 09:22

## 2018-11-12 ASSESSMENT — PAIN DESCRIPTION - PAIN TYPE: TYPE: CHRONIC PAIN

## 2018-11-12 ASSESSMENT — PAIN SCALES - GENERAL
PAINLEVEL_OUTOF10: 0
PAINLEVEL_OUTOF10: 4
PAINLEVEL_OUTOF10: 0
PAINLEVEL_OUTOF10: 5

## 2018-11-12 ASSESSMENT — PAIN DESCRIPTION - LOCATION: LOCATION: NECK

## 2018-11-12 NOTE — CONSULTS
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  a right-handed white  female who presents to the emergency department With the worst headache of her life which radiates down both sides of her neck posteriorly. The onset of the symptoms were November 9. Context and setting: This happened at home. Quality pain: Sharp. She states the pain is all over her head but primarily in the occiput. Today the headache is more in the frontal areas  Modifying factors the patient denies nausea vomiting diarrhea or any other associated symptoms. Intermittent headaches are throbbing in nature. Patient usually takes a Tylenol at the [facility] of the headache in the past.  Headaches are infrequent in the past  Patient's had no prior treatment for this condition. Thing makes the headache better or worse. The patient has difficulty moving her neck because of the pain. Severity moderate to severe. Patient was a diagnosis of Parkinson disease in 2017. She sees Dr. Peg Contreras. She been on carbidopa levodopa with a good response. Patient has had back pain off and on  Sometimes her shoulders hurt. .  In the passive for the headache, shoulder pain and neck pain she usually took Tylenol for the motor response       HPI     Nursing Notes were reviewed.     Review of Systems   Constitutional: Negative for appetite change, chills, diaphoresis, fatigue and fever. HENT: Negative for congestion, dental problem, ear discharge, ear pain, facial swelling, hearing loss, mouth sores, nosebleeds, postnasal drip, rhinorrhea, sinus pressure, sneezing, sore throat, tinnitus, trouble swallowing and voice change. Eyes: Negative for photophobia, pain, discharge, redness, itching and visual disturbance. Respiratory: Negative for cough, choking, chest tightness, shortness of breath, wheezing and stridor. Cardiovascular: Negative for chest pain, palpitations and leg swelling.    Gastrointestinal: Negative for abdominal distention, abdominal reveals no gallop and no friction rub. No murmur heard. Pulmonary/Chest: Effort normal and breath sounds normal. No stridor. No respiratory distress. She has no wheezes. She has no rales. She exhibits no tenderness. Abdominal: Soft. Bowel sounds are normal. She exhibits no distension and no mass. There is no tenderness. There is no rebound and no guarding. Musculoskeletal: Normal range of motion. She exhibits no edema or tenderness. Patient has significantly decreased range of motion of her neck. All other limbs were palpated and are completely nontender the patient has no pedal edema. Lymphadenopathy:     She has no cervical adenopathy. Neurological: She is alert and oriented to person, place, and time. She has normal reflexes. She displays normal reflexes. No cranial nerve deficit. She exhibits normal muscle tone. Coordination normal.   Skin: Skin is warm and dry. No rash noted. She is not diaphoretic. No erythema. No pallor. Psychiatric: She has a normal mood and affect. Her behavior is normal. Judgment and thought content normal.   Nursing note and vitals reviewed.           Except as noted above the remainder of the review of systems was reviewed and negative.         PAST MEDICAL HISTORY      Past Medical History        Past Medical History:   Diagnosis Date    Closed left arm fracture 09/23/2016    COPD (chronic obstructive pulmonary disease) ([facility] [address]      Falls frequently      GERD (gastroesophageal reflux disease)      Hypertension      Mitral valve prolapse      Thyroid disease                 SURGICAL HISTORY        Past Surgical History         Past Surgical History:   Procedure Laterality Date    APPENDECTOMY        CHOLECYSTECTOMY        HYSTERECTOMY        TONSILLECTOMY        UPPER GASTROINTESTINAL ENDOSCOPY   2/11/16      DR. MEZA                CURRENT MEDICATIONS             Previous Medications     ALBUTEROL SULFATE  (90 BASE) MCG/ACT INHALER    Inhale 2 puffs

## 2018-11-12 NOTE — PROGRESS NOTES
Hospitalist  Follow-up      Date of Service: 11/12/2018    Subjective:    Complains that her neck pain in the posterior headache is slightly improving but still around 5/10. She is able to turn her head to the right of the left better than before. She is using cane to walk to the bathroom           Past Medical History:   Diagnosis Date    Closed left arm fracture 09/23/2016    COPD (chronic obstructive pulmonary disease) (HCC)     Falls frequently     GERD (gastroesophageal reflux disease)     Hypertension     Mitral valve prolapse     Thyroid disease      family history is not on file. reports that she quit smoking about 26 years ago. She has never used smokeless tobacco. She reports that she does not drink alcohol or use drugs. Case DW RN       Objective:  Exam:  BP (!) 154/65   Pulse 52   Temp 97.3 °F (36.3 °C) (Oral)   Resp 18   Ht 4' 11\" (1.499 m)   Wt 98 lb 1.7 oz (44.5 kg)   SpO2 98%   BMI 19.81 kg/m²     Physical Exam:  CONSTITUTIONAL:  somnolent and mild distress  EYES:  pupils equal, round and reactive to light, sclera clear and conjunctiva normal  ENT:  normocepalic, without obvious abnormality, atraumatic, poor dentition, dry mucosa  NECK:  supple, symmetrical, trachea midline, tenderness over the scaphoid muscles.   Limited range of motion  LUNGS:  no increased work of breathing and clear to auscultation  CARDIOVASCULAR:  normal apical pulses and normal S1 and S2  ABDOMEN:  normal bowel sounds and non-tender  MUSCULOSKELETAL:  there is no redness, warmth, or swelling of the joints  full range of motion noted  NEUROLOGIC:  Mental Status Exam:  Level of Alertness:   awake  Orientation:   person, place, time  Attention/Concentration:  normal  Cranial Nerves:  cranial nerves II-XII are grossly intact  No focal motor or sensory weakness noted  SKIN:  normal skin color, texture,     Medications:  Current Facility-Administered Medications   Medication Dose Route Frequency Provider Last Rate Last Dose    gabapentin (NEURONTIN) capsule 100 mg  100 mg Oral BID Jules Gallego MD   100 mg at 11/12/18 2297    tiZANidine (ZANAFLEX) tablet 2 mg  2 mg Oral TID PRN Jules Gallego MD        methylPREDNISolone (MEDROL) tablet 4 mg  4 mg Oral Daily Jules Gallego MD   4 mg at 11/12/18 0956    sodium chloride flush 0.9 % injection 10 mL  10 mL Intravenous 2 times per day Francia Lopez PA-C   10 mL at 11/11/18 2232    sodium chloride flush 0.9 % injection 10 mL  10 mL Intravenous PRN Francia Lopez PA-C        magnesium hydroxide (MILK OF MAGNESIA) 400 MG/5ML suspension 30 mL  30 mL Oral Daily PRN Francia Lopez PA-C        ondansetron (ZOFRAN) injection 4 mg  4 mg Intravenous Q6H PRN Francia Lopez PA-C        enoxaparin (LOVENOX) injection 40 mg  40 mg Subcutaneous Daily Francia Lopez PA-C   40 mg at 11/12/18 1127    acetaminophen (TYLENOL) tablet 650 mg  650 mg Oral Q4H PRN Francia Lopez PA-C   650 mg at 11/11/18 2232    ketorolac (TORADOL) injection 15 mg  15 mg Intravenous Q6H PRN Francia Lopez PA-C        hydrALAZINE (APRESOLINE) injection 5 mg  5 mg Intravenous Q6H PRN Francia Lopez PA-C        lidocaine 4 % external patch 2 patch  2 patch Transdermal Daily Francia Lopez PA-C   2 patch at 11/11/18 2232    amLODIPine (NORVASC) tablet 5 mg  5 mg Oral Daily Francia Lopez PA-C   5 mg at 11/12/18 4652    carbidopa-levodopa (SINEMET)  MG per tablet 1 tablet  1 tablet Oral 4x Daily Francia Lopez PA-C   1 tablet at 45/52/07 8338    folic acid (FOLVITE) tablet 1 mg  1 mg Oral Daily Francia Lopez PA-C   1 mg at 11/12/18 6779    levothyroxine (SYNTHROID) tablet 50 mcg  50 mcg Oral Daily Francia Lopez PA-C   50 mcg at 11/12/18 2062       Data:  LABS:   Lab Results   Component Value Date     11/11/2018    K 3.7 11/11/2018     11/11/2018    CO2 26 11/11/2018    BUN 22 11/11/2018    CREATININE 0.73 11/11/2018    GFRAA >60.0 11/11/2018 LABGLOM >60.0 11/11/2018    GLUCOSE 82 11/11/2018    GLUCOSE 78 01/07/2012    MG 2.3 12/27/2014    CALCIUM 8.8 11/11/2018     Lab Results   Component Value Date    WBC 4.8 11/11/2018    RBC 3.62 11/11/2018    RBC 4.80 01/07/2012    HGB 11.5 11/11/2018    HCT 33.1 11/11/2018    MCV 91.4 11/11/2018    RDW 13.5 11/11/2018     11/11/2018     Lab Results   Component Value Date    INR 1.0 12/27/2014    PROTIME 10.4 12/27/2014     No results found for: ORG    RADIOLOGY:  Ct Head Wo Contrast    Result Date: 11/10/2018  CT HEAD WO CONTRAST CLINICAL HISTORY:  Worst headache of life COMPARISON: October 2, 2016 TECHNIQUE: Multiple unenhanced serial axial images of the brain from the vertex of the skull to the base of the skull were performed. FINDINGS: The ventricles are dilated. This is compensatory to the surrounding moderate generalized parenchymal volume loss. No mass. No midline shift. The cisterns are patent. There are white matter and periventricular changes most likely consistent with chronic small vessel disease. No acute intra-axial or extra-axial findings. The visualized osseous structures are unremarkable. The visualized portion of the paranasal sinuses again show trace free fluid in the right-sided sphenoid. Unchanged. The mastoids are unremarkable. NO ACUTE INTRA-AXIAL OR EXTRA-AXIAL FINDINGS. IF SIGNS OR SYMPTOMS PERSIST THEN CONSIDER REPEAT CT SCAN IN 12 TO 24 HOURS OR MRI TO FURTHER EVALUATE IF THERE ARE NO CONTRAINDICATIONS All CT scans at this facility use dose modulation, iterative reconstruction, and/or weight based dosing when appropriate to reduce radiation dose to as low as reasonably achievable. Asesment and Plan :  . Headache bitemporal frontal and occipital.. Ct head is neg. LP is neg. CRP and ESR are negative She also c/o cervical pain and neck stiffness. Suspect cervical disc disease and we need to r/o spinal stenosis. Denied recent falls.   She complains of bilateral feet

## 2018-11-12 NOTE — CARE COORDINATION
11/12/18 I MET WITH THE PT THIS AM. WE DISCUSSED OBSERVATION STATUS AND HER 48 HOUR TIME IS THIS EVENING AT 20:20. SHE IS HAVING AN MRI CERVICAL. DR HALL TOLD ME IF NEG SHE WILL BE DISCHARGED AS OBS TODAY. I DID NOTICE RN DOCUMENTATION SLOW STEADY GAIT. PT PLANS DISCHARGE HOME. DENIES ANY NEEDS BUT PT/OT HAS NOT BEEN ABLE TO SEE HE AS YET. I DID SPEAK WITH THEM THIS AM TO SEE FOR DISCHARGE NEEDS. Roni Vinson

## 2018-11-12 NOTE — PLAN OF CARE
Problem: Nutrition  Goal: Optimal nutrition therapy  Outcome: Ongoing  Nutrition Problem: Moderate malnutrition  Intervention: Food and/or Nutrient Delivery: Continue current diet, Start ONS  Nutritional Goals: PO intake >75% of meals and supplements. No further significant weight loss.

## 2018-11-13 LAB
ANION GAP SERPL CALCULATED.3IONS-SCNC: 14 MEQ/L (ref 7–13)
BASOPHILS ABSOLUTE: 0 K/UL (ref 0–0.2)
BASOPHILS RELATIVE PERCENT: 0.1 %
BUN BLDV-MCNC: 23 MG/DL (ref 8–23)
CALCIUM SERPL-MCNC: 9.3 MG/DL (ref 8.6–10.2)
CHLORIDE BLD-SCNC: 102 MEQ/L (ref 98–107)
CO2: 25 MEQ/L (ref 22–29)
CREAT SERPL-MCNC: 0.63 MG/DL (ref 0.5–0.9)
EOSINOPHILS ABSOLUTE: 0.1 K/UL (ref 0–0.7)
EOSINOPHILS RELATIVE PERCENT: 0.7 %
GFR AFRICAN AMERICAN: >60
GFR NON-AFRICAN AMERICAN: >60
GLUCOSE BLD-MCNC: 91 MG/DL (ref 74–109)
GRAM STAIN RESULT: NORMAL
HCT VFR BLD CALC: 43.2 % (ref 37–47)
HEMOGLOBIN: 14.7 G/DL (ref 12–16)
LYMPHOCYTES ABSOLUTE: 0.6 K/UL (ref 1–4.8)
LYMPHOCYTES RELATIVE PERCENT: 6.4 %
MCH RBC QN AUTO: 31.4 PG (ref 27–31.3)
MCHC RBC AUTO-ENTMCNC: 34 % (ref 33–37)
MCV RBC AUTO: 92.2 FL (ref 82–100)
MONOCYTES ABSOLUTE: 0.6 K/UL (ref 0.2–0.8)
MONOCYTES RELATIVE PERCENT: 6.3 %
NEUTROPHILS ABSOLUTE: 8.1 K/UL (ref 1.4–6.5)
NEUTROPHILS RELATIVE PERCENT: 86.5 %
PDW BLD-RTO: 13.5 % (ref 11.5–14.5)
PLATELET # BLD: 125 K/UL (ref 130–400)
POTASSIUM SERPL-SCNC: 4.3 MEQ/L (ref 3.5–5.1)
RBC # BLD: 4.68 M/UL (ref 4.2–5.4)
SODIUM BLD-SCNC: 141 MEQ/L (ref 132–144)
WBC # BLD: 9.4 K/UL (ref 4.8–10.8)

## 2018-11-13 PROCEDURE — 6360000002 HC RX W HCPCS: Performed by: PHYSICIAN ASSISTANT

## 2018-11-13 PROCEDURE — 36415 COLL VENOUS BLD VENIPUNCTURE: CPT

## 2018-11-13 PROCEDURE — 6370000000 HC RX 637 (ALT 250 FOR IP): Performed by: PHYSICIAN ASSISTANT

## 2018-11-13 PROCEDURE — 96372 THER/PROPH/DIAG INJ SC/IM: CPT

## 2018-11-13 PROCEDURE — 6370000000 HC RX 637 (ALT 250 FOR IP): Performed by: INTERNAL MEDICINE

## 2018-11-13 PROCEDURE — 97535 SELF CARE MNGMENT TRAINING: CPT

## 2018-11-13 PROCEDURE — 6360000002 HC RX W HCPCS: Performed by: INTERNAL MEDICINE

## 2018-11-13 PROCEDURE — G8988 SELF CARE GOAL STATUS: HCPCS

## 2018-11-13 PROCEDURE — G0378 HOSPITAL OBSERVATION PER HR: HCPCS

## 2018-11-13 PROCEDURE — 96375 TX/PRO/DX INJ NEW DRUG ADDON: CPT

## 2018-11-13 PROCEDURE — 97116 GAIT TRAINING THERAPY: CPT

## 2018-11-13 PROCEDURE — 96376 TX/PRO/DX INJ SAME DRUG ADON: CPT

## 2018-11-13 PROCEDURE — 1210000000 HC MED SURG R&B

## 2018-11-13 PROCEDURE — 97166 OT EVAL MOD COMPLEX 45 MIN: CPT

## 2018-11-13 PROCEDURE — 85025 COMPLETE CBC W/AUTO DIFF WBC: CPT

## 2018-11-13 PROCEDURE — 2580000003 HC RX 258: Performed by: INTERNAL MEDICINE

## 2018-11-13 PROCEDURE — G8987 SELF CARE CURRENT STATUS: HCPCS

## 2018-11-13 PROCEDURE — 80048 BASIC METABOLIC PNL TOTAL CA: CPT

## 2018-11-13 PROCEDURE — 2580000003 HC RX 258: Performed by: PHYSICIAN ASSISTANT

## 2018-11-13 RX ORDER — FOLIC ACID 1 MG/1
1 TABLET ORAL DAILY
Status: DISCONTINUED | OUTPATIENT
Start: 2018-11-14 | End: 2018-11-14 | Stop reason: HOSPADM

## 2018-11-13 RX ORDER — SODIUM CHLORIDE, SODIUM LACTATE, POTASSIUM CHLORIDE, AND CALCIUM CHLORIDE .6; .31; .03; .02 G/100ML; G/100ML; G/100ML; G/100ML
500 INJECTION, SOLUTION INTRAVENOUS ONCE
Status: COMPLETED | OUTPATIENT
Start: 2018-11-13 | End: 2018-11-13

## 2018-11-13 RX ORDER — LEVOTHYROXINE SODIUM 0.05 MG/1
50 TABLET ORAL DAILY
Status: DISCONTINUED | OUTPATIENT
Start: 2018-11-14 | End: 2018-11-14 | Stop reason: HOSPADM

## 2018-11-13 RX ORDER — AMLODIPINE BESYLATE 5 MG/1
5 TABLET ORAL DAILY
Status: DISCONTINUED | OUTPATIENT
Start: 2018-11-14 | End: 2018-11-14 | Stop reason: HOSPADM

## 2018-11-13 RX ADMIN — CARBIDOPA AND LEVODOPA 1 TABLET: 25; 100 TABLET ORAL at 16:53

## 2018-11-13 RX ADMIN — METHYLPREDNISOLONE 4 MG: 4 TABLET ORAL at 09:11

## 2018-11-13 RX ADMIN — CARBIDOPA AND LEVODOPA 1 TABLET: 25; 100 TABLET ORAL at 09:11

## 2018-11-13 RX ADMIN — GABAPENTIN 100 MG: 100 CAPSULE ORAL at 21:19

## 2018-11-13 RX ADMIN — AMLODIPINE BESYLATE 5 MG: 5 TABLET ORAL at 09:11

## 2018-11-13 RX ADMIN — CARBIDOPA AND LEVODOPA 1 TABLET: 25; 100 TABLET ORAL at 21:19

## 2018-11-13 RX ADMIN — HYDRALAZINE HYDROCHLORIDE 5 MG: 20 INJECTION INTRAMUSCULAR; INTRAVENOUS at 22:39

## 2018-11-13 RX ADMIN — ACETAMINOPHEN 650 MG: 325 TABLET ORAL at 11:56

## 2018-11-13 RX ADMIN — SODIUM CHLORIDE, POTASSIUM CHLORIDE, SODIUM LACTATE AND CALCIUM CHLORIDE 500 ML: 600; 310; 30; 20 INJECTION, SOLUTION INTRAVENOUS at 12:34

## 2018-11-13 RX ADMIN — CARBIDOPA AND LEVODOPA 1 TABLET: 25; 100 TABLET ORAL at 11:56

## 2018-11-13 RX ADMIN — Medication 10 ML: at 21:19

## 2018-11-13 RX ADMIN — ACETAMINOPHEN 325 MG: 325 TABLET ORAL at 07:44

## 2018-11-13 RX ADMIN — KETOROLAC TROMETHAMINE 15 MG: 15 INJECTION, SOLUTION INTRAMUSCULAR; INTRAVENOUS at 22:40

## 2018-11-13 RX ADMIN — ENOXAPARIN SODIUM 40 MG: 40 INJECTION SUBCUTANEOUS at 09:11

## 2018-11-13 RX ADMIN — LEVOTHYROXINE SODIUM 50 MCG: 0.05 TABLET ORAL at 05:07

## 2018-11-13 RX ADMIN — FOLIC ACID 1 MG: 1 TABLET ORAL at 09:11

## 2018-11-13 RX ADMIN — Medication 10 ML: at 09:11

## 2018-11-13 ASSESSMENT — PAIN SCALES - GENERAL
PAINLEVEL_OUTOF10: 7
PAINLEVEL_OUTOF10: 5
PAINLEVEL_OUTOF10: 7
PAINLEVEL_OUTOF10: 10

## 2018-11-13 ASSESSMENT — PAIN DESCRIPTION - PAIN TYPE: TYPE: CHRONIC PAIN

## 2018-11-13 ASSESSMENT — PAIN DESCRIPTION - LOCATION: LOCATION: NECK

## 2018-11-13 ASSESSMENT — PAIN DESCRIPTION - PROGRESSION: CLINICAL_PROGRESSION: NOT CHANGED

## 2018-11-13 NOTE — PROGRESS NOTES
Neurology Follow up    SUBJECTIVE:  NO Headache, double vision,blurry vision,difficulty with speech,difficulty with swallowing,weakness,numbness,pain,nausea,vomitting,chocking,neck pain,dizziness,    PHYSICAL EXAM:    BP (!) 154/65   Pulse 52   Temp 97.3 °F (36.3 °C) (Oral)   Resp 18   Ht 4' 11\" (1.499 m)   Wt 98 lb 1.7 oz (44.5 kg)   SpO2 98%   BMI 19.81 kg/m²    General Appearance:      Mental Status Exam:             Level of Alertness:   awake            Orientation:   person, place, time                      Attention/Concentration:  normal            Language:  normal      Funduscopic Exam:     Cranial Nerves               Pupils:  equal, round, reactive to light      Cranial nerves III, IV, VI           Extraocular Movements: intact               Hearing:  intact      Cranial nerve IX           Palate:  intact      Cranial nerve XI         Shoulder shrug:  intact      Cranial nerve XII          Tongue movement:  normal    Motor:    Drift:  absent  Motor exam is symmetrical 4 out of 5 all extremities bilaterally  Tone:  normal  Abnormal Movements:  absent            Sensory:        Pinprick             Right Upper Extremity:  normal             Left Upper Extremity:  normal             Right Lower Extremity:  normal             Left Lower Extremity:  normal           Vibration                         Touch            Proprioception                 Coordination:           Finger/Nose   Right:  normal              Left:  normal          Heel-Knee-Shin                Right:  normal              Left:  normal          Rapid Alternating Movements              Right:  normal              Left:  normal          Gait:                       Casual:    Mild ataxia                       Romberg:  normal            Reflexes:             Deep Tendon Reflexes:             Reflexes are 2 +             Plantar response:                Right:  downgoing               Left:  downgoing    Vascular:  Cardiac Exam:  normal Ct Head Wo Contrast    Result Date: 11/10/2018  CT HEAD WO CONTRAST CLINICAL HISTORY:  Worst headache of life COMPARISON: October 2, 2016 TECHNIQUE: Multiple unenhanced serial axial images of the brain from the vertex of the skull to the base of the skull were performed. FINDINGS: The ventricles are dilated. This is compensatory to the surrounding moderate generalized parenchymal volume loss. No mass. No midline shift. The cisterns are patent. There are white matter and periventricular changes most likely consistent with chronic small vessel disease. No acute intra-axial or extra-axial findings. The visualized osseous structures are unremarkable. The visualized portion of the paranasal sinuses again show trace free fluid in the right-sided sphenoid. Unchanged. The mastoids are unremarkable. NO ACUTE INTRA-AXIAL OR EXTRA-AXIAL FINDINGS. IF SIGNS OR SYMPTOMS PERSIST THEN CONSIDER REPEAT CT SCAN IN 12 TO 24 HOURS OR MRI TO FURTHER EVALUATE IF THERE ARE NO CONTRAINDICATIONS All CT scans at this facility use dose modulation, iterative reconstruction, and/or weight based dosing when appropriate to reduce radiation dose to as low as reasonably achievable. Recent Labs      11/10/18   1345  11/11/18   0556   WBC  7.0  4.8   HGB  14.3  11.5*   PLT  114*  110*     Recent Labs      11/10/18   1345  11/11/18   0556   NA  143  142   K  4.3  3.7   CL  105  106   CO2  30*  26   BUN  23  22   CREATININE  0.61  0.73   GLUCOSE  91  82     Recent Labs      11/10/18   1345   BILITOT  0.5   ALKPHOS  96   AST  26   ALT  <5     Lab Results   Component Value Date    PROTIME 10.4 12/27/2014    PROTIME 10.1 01/07/2012    INR 1.0 12/27/2014     No results found for: LITHIUM, DILFRTOT, VALPROATE    ASSESSMENT AND PLAN  Cervicalgia with cervicogenic headaches  Better  LP negative for blood therefore not SAH  Mild PD features and ataxia  May benefit rehab  No cervical myelopathy    Emanuel Adrian MD, [address], American Board of Psychiatry & Neurology  Board Certified in Vascular Neurology  Board Certified in Neuromuscular Medicine  Certified in Neurorehabilitation

## 2018-11-13 NOTE — DISCHARGE INSTR - COC
scale): Pain Level: 10  Last Weight:   Wt Readings from Last 1 Encounters:   11/10/18 98 lb 1.7 oz (44.5 kg)     Mental Status:  disoriented    IV Access:  - None    Nursing Mobility/ADLs:  Walking   Assisted  Transfer  Assisted  Bathing  Assisted  Dressing  Assisted  Toileting  Assisted  Feeding  Independent  Med Admin  Assisted  Med Delivery   whole    Wound Care Documentation and Therapy:        Elimination:  Continence:   · Bowel: Yes  · Bladder: Yes  Urinary Catheter: None   Colostomy/Ileostomy/Ileal Conduit: No       Date of Last BM: 11/12/18    Intake/Output Summary (Last 24 hours) at 11/13/18 1057  Last data filed at 11/12/18 1801   Gross per 24 hour   Intake              240 ml   Output                0 ml   Net              240 ml     I/O last 3 completed shifts: In: 360 [P.O.:360]  Out: -     Safety Concerns:     Sundowners [place], fall risk    Impairments/Disabilities:      None    Nutrition Therapy:  Current Nutrition Therapy:   - Oral Diet:  General, high calorie    Routes of Feeding: Oral  Liquids: Thin Liquids  Daily Fluid Restriction: no  Last Modified Barium Swallow with Video (Video Swallowing Test): not done    Treatments at the Time of Hospital Discharge:   Respiratory Treatments: none  Oxygen Therapy:  is not on home oxygen therapy.   Ventilator:    - No ventilator support    Rehab Therapies: Physical Therapy and Occupational Therapy  Weight Bearing Status/Restrictions: No weight bearing restirctions  Other Medical Equipment (for information only, NOT a DME order):  walker  Other Treatments:     Patient's personal belongings (please select all that are sent with patient):  Venkatesh    RN SIGNATURE:  Electronically signed by Luis Vargas RN on 11/14/18 at 11:47 AM    CASE MANAGEMENT/SOCIAL WORK SECTION    Inpatient Status Date: ***    Readmission Risk Assessment Score:  Readmission Risk              Risk of Unplanned Readmission:        8           Discharging to Facility/ Agency   · Name: [facility]  · Address: [address] [place] [address] [place] Pierce, [address] [place] [address]  · Phone: [phone]  · Fax: [phone]    Dialysis Facility (if applicable)   · Name:  · Address:  · Dialysis Schedule:  · Phone:  · Fax:    / signature: {Esignature:[id number]}    PHYSICIAN SECTION    Prognosis: Fair    Condition at Discharge: Stable    Rehab Potential (if transferring to Rehab): Fair    Recommended Labs or Other Treatments After Discharge: non    Physician Certification: I certify the above information and transfer of Rico Oh  is necessary for the continuing treatment of the diagnosis listed and that she requires Home Care for less 30 days.      Update Admission H&P: No change in H&P    PHYSICIAN SIGNATURE:  Electronically signed by Stephane Chavez DO on 11/13/18 at 11:17 AM

## 2018-11-13 NOTE — PROGRESS NOTES
myelopathy    Emanuelprema Stone MD, [facility], American Board of Psychiatry & Neurology  Board Certified in Vascular Neurology  Board Certified in Neuromuscular Medicine  Certified in Kirt Kumar

## 2018-11-14 ENCOUNTER — TELEPHONE (OUTPATIENT)
Dept: FAMILY MEDICINE CLINIC | Age: 78
End: 2018-11-14

## 2018-11-14 VITALS
OXYGEN SATURATION: 98 % | RESPIRATION RATE: 18 BRPM | WEIGHT: 98.11 LBS | DIASTOLIC BLOOD PRESSURE: 81 MMHG | BODY MASS INDEX: 19.78 KG/M2 | SYSTOLIC BLOOD PRESSURE: 173 MMHG | HEART RATE: 73 BPM | TEMPERATURE: 97.2 F | HEIGHT: 59 IN

## 2018-11-14 LAB
ANION GAP SERPL CALCULATED.3IONS-SCNC: 12 MEQ/L (ref 7–13)
BASOPHILS ABSOLUTE: 0 K/UL (ref 0–0.2)
BASOPHILS RELATIVE PERCENT: 0.2 %
BUN BLDV-MCNC: 27 MG/DL (ref 8–23)
CALCIUM SERPL-MCNC: 9.1 MG/DL (ref 8.6–10.2)
CHLORIDE BLD-SCNC: 107 MEQ/L (ref 98–107)
CO2: 26 MEQ/L (ref 22–29)
CREAT SERPL-MCNC: 0.73 MG/DL (ref 0.5–0.9)
CSF CULTURE: NORMAL
EOSINOPHILS ABSOLUTE: 0.1 K/UL (ref 0–0.7)
EOSINOPHILS RELATIVE PERCENT: 1.3 %
GFR AFRICAN AMERICAN: >60
GFR NON-AFRICAN AMERICAN: >60
GLUCOSE BLD-MCNC: 96 MG/DL (ref 74–109)
HCT VFR BLD CALC: 38 % (ref 37–47)
HEMOGLOBIN: 13.3 G/DL (ref 12–16)
LYMPHOCYTES ABSOLUTE: 1 K/UL (ref 1–4.8)
LYMPHOCYTES RELATIVE PERCENT: 14 %
MCH RBC QN AUTO: 31.9 PG (ref 27–31.3)
MCHC RBC AUTO-ENTMCNC: 35.1 % (ref 33–37)
MCV RBC AUTO: 90.7 FL (ref 82–100)
MONOCYTES ABSOLUTE: 0.8 K/UL (ref 0.2–0.8)
MONOCYTES RELATIVE PERCENT: 10.9 %
NEUTROPHILS ABSOLUTE: 5.2 K/UL (ref 1.4–6.5)
NEUTROPHILS RELATIVE PERCENT: 73.6 %
PDW BLD-RTO: 13.6 % (ref 11.5–14.5)
PLATELET # BLD: 129 K/UL (ref 130–400)
POTASSIUM SERPL-SCNC: 3.8 MEQ/L (ref 3.5–5.1)
RBC # BLD: 4.19 M/UL (ref 4.2–5.4)
SODIUM BLD-SCNC: 145 MEQ/L (ref 132–144)
WBC # BLD: 7 K/UL (ref 4.8–10.8)

## 2018-11-14 PROCEDURE — 6370000000 HC RX 637 (ALT 250 FOR IP): Performed by: PHYSICIAN ASSISTANT

## 2018-11-14 PROCEDURE — 6360000002 HC RX W HCPCS: Performed by: PHYSICIAN ASSISTANT

## 2018-11-14 PROCEDURE — 96372 THER/PROPH/DIAG INJ SC/IM: CPT

## 2018-11-14 PROCEDURE — 80048 BASIC METABOLIC PNL TOTAL CA: CPT

## 2018-11-14 PROCEDURE — 97116 GAIT TRAINING THERAPY: CPT

## 2018-11-14 PROCEDURE — 85025 COMPLETE CBC W/AUTO DIFF WBC: CPT

## 2018-11-14 PROCEDURE — G0378 HOSPITAL OBSERVATION PER HR: HCPCS

## 2018-11-14 PROCEDURE — 36415 COLL VENOUS BLD VENIPUNCTURE: CPT

## 2018-11-14 PROCEDURE — 2580000003 HC RX 258: Performed by: PHYSICIAN ASSISTANT

## 2018-11-14 PROCEDURE — 6360000002 HC RX W HCPCS: Performed by: INTERNAL MEDICINE

## 2018-11-14 RX ADMIN — CARBIDOPA AND LEVODOPA 1 TABLET: 25; 100 TABLET ORAL at 13:24

## 2018-11-14 RX ADMIN — AMLODIPINE BESYLATE 5 MG: 5 TABLET ORAL at 07:58

## 2018-11-14 RX ADMIN — ENOXAPARIN SODIUM 40 MG: 40 INJECTION SUBCUTANEOUS at 07:58

## 2018-11-14 RX ADMIN — FOLIC ACID 1 MG: 1 TABLET ORAL at 07:58

## 2018-11-14 RX ADMIN — CARBIDOPA AND LEVODOPA 1 TABLET: 25; 100 TABLET ORAL at 07:57

## 2018-11-14 RX ADMIN — ACETAMINOPHEN 650 MG: 325 TABLET ORAL at 07:58

## 2018-11-14 RX ADMIN — METHYLPREDNISOLONE 4 MG: 4 TABLET ORAL at 07:58

## 2018-11-14 RX ADMIN — ACETAMINOPHEN 650 MG: 325 TABLET ORAL at 13:23

## 2018-11-14 RX ADMIN — LEVOTHYROXINE SODIUM 50 MCG: 50 TABLET ORAL at 07:58

## 2018-11-14 RX ADMIN — Medication 10 ML: at 07:58

## 2018-11-14 ASSESSMENT — PAIN SCALES - GENERAL
PAINLEVEL_OUTOF10: 7
PAINLEVEL_OUTOF10: 6
PAINLEVEL_OUTOF10: 0
PAINLEVEL_OUTOF10: 0
PAINLEVEL_OUTOF10: 5
PAINLEVEL_OUTOF10: 3
PAINLEVEL_OUTOF10: 0
PAINLEVEL_OUTOF10: 0

## 2018-11-14 ASSESSMENT — PAIN DESCRIPTION - FREQUENCY: FREQUENCY: CONTINUOUS

## 2018-11-14 ASSESSMENT — PAIN DESCRIPTION - PAIN TYPE
TYPE: CHRONIC PAIN
TYPE: CHRONIC PAIN

## 2018-11-14 ASSESSMENT — PAIN DESCRIPTION - LOCATION
LOCATION: NECK
LOCATION: BACK

## 2018-11-14 ASSESSMENT — PAIN DESCRIPTION - DESCRIPTORS
DESCRIPTORS: ACHING
DESCRIPTORS: CONSTANT

## 2018-11-14 ASSESSMENT — PAIN DESCRIPTION - PROGRESSION
CLINICAL_PROGRESSION: NOT CHANGED

## 2018-11-14 ASSESSMENT — PAIN DESCRIPTION - ONSET: ONSET: ON-GOING

## 2018-11-14 NOTE — PROGRESS NOTES
5 mgs IV hydralazine given for BP of 174/82. Pt is very confused at this time calling daughter Jennifer Gaines repeatedly. I attempted to get an avasys for pt but there is none available in the hospital. Assoc manager is in the room trying to reorient pt at this time with no success. Pt refuses to stop calling daughter. Will cont to monitor.

## 2018-11-14 NOTE — PROGRESS NOTES
Neurology Follow up    SUBJECTIVE:  NO Headache, double vision,blurry vision,difficulty with speech,difficulty with swallowing,weakness,numbness,pain,nausea,vomitting,chocking,neck pain,dizziness,    PHYSICAL EXAM:  BP (!) 186/75   Pulse 68   Temp 97.7 °F (36.5 °C) (Oral)   Resp 18   Ht 4' 11\" (1.499 m)   Wt 98 lb 1.7 oz (44.5 kg)   SpO2 95%   BMI 19.81 kg/m²       General Appearance:      Mental Status Exam:             Level of Alertness:   Awake/ noctarnal confusion, sleep deprived            Orientation:   person, place,                       Attention/Concentration:  normal            Language:  normal      Funduscopic Exam:     Cranial Nerves               Pupils:  equal, round, reactive to light      Cranial nerves III, IV, VI           Extraocular Movements: intact               Hearing:  intact      Cranial nerve IX           Palate:  intact      Cranial nerve XI         Shoulder shrug:  intact      Cranial nerve XII          Tongue movement:  normal    Motor:    Drift:  absent  Motor exam is symmetrical 4 out of 5 all extremities bilaterally  Tone:  normal  Abnormal Movements:  absent            Sensory:        Pinprick             Right Upper Extremity:  normal             Left Upper Extremity:  normal             Right Lower Extremity:  normal             Left Lower Extremity:  normal           Vibration                         Touch            Proprioception                 Coordination:           Finger/Nose   Right:  normal              Left:  normal          Heel-Knee-Shin                Right:  normal              Left:  normal          Rapid Alternating Movements              Right:  normal              Left:  normal          Gait:                       Casual:    Mild ataxia                       Romberg:  normal            Reflexes:             Deep Tendon Reflexes:             Reflexes are 2 +             Plantar response:                Right:  downgoing               Left:

## 2018-11-14 NOTE — PROGRESS NOTES
Physical Therapy Med Surg Daily Treatment Note  Facility/Department: [facility]  [facility]       NAME: Umang Antonio  : 1940 (37 y.o.)  MRN: [id number]  CODE STATUS: Full Code    Date of Service: 2018    Patient Diagnosis(es): HA (headache) [R51]  Ataxia [R27.0]   Chief Complaint   Patient presents with    Headache     neck pains-since yest-denies injury     Patient Active Problem List    Diagnosis Date Noted    Moderate malnutrition ([facility] [address] 2018    Ataxia 2018    HA (headache) 11/10/2018        Past Medical History:   Diagnosis Date    Closed left arm fracture 2016    COPD [facility]chronic obstructive pulmonary disease) ([facility] [address] [facility]     Falls frequently     GERD (gastroesophageal reflux disease)     Hypertension     Mitral valve prolapse     Thyroid disease      Past Surgical History:   Procedure Laterality Date    APPENDECTOMY      CHOLECYSTECTOMY      HYSTERECTOMY      TONSILLECTOMY      UPPER GASTROINTESTINAL ENDOSCOPY  16     DR. MEZA           Restrictions:  Restrictions/Precautions: Fall Risk    SUBJECTIVE:  Subjective  Subjective: I need to get up and move. Pre Pain Assessment:  Pre Treatment Pain Screening  Pain at present: 6  Intervention List: Patient able to continue with treatment          Post Pain Assessment:   Pain Assessment  Pain Level: 3  Pain Type: Chronic pain  Pain Location: Back  Pain Descriptors: Aching       OBJECTIVE:         Bed mobility  Rolling to Left: Stand by assistance  Rolling to Right: Stand by assistance  Supine to Sit: Stand by assistance  Sit to Supine: Stand by assistance  Comment: vc's for step by step technique; increased time and effort to complete    Transfers  Sit to Stand: Stand by assistance  Stand to sit: Stand by assistance  Comment: vc's for technique; decreased safety awareness as pt tried to stand without device;      Ambulation 1  Surface: level tile  Device: Rolling Walker  Assistance: Stand by 10 mins  Bm/Trsf -6 mins       Shyam Trevino PTA, 11/14/18 at 9:58 AM

## 2018-11-19 ENCOUNTER — OFFICE VISIT (OUTPATIENT)
Dept: GERIATRIC MEDICINE | Age: 78
End: 2018-11-19
Payer: MEDICARE

## 2018-11-19 DIAGNOSIS — G43.909 MIGRAINE WITHOUT STATUS MIGRAINOSUS, NOT INTRACTABLE, UNSPECIFIED MIGRAINE TYPE: Primary | ICD-10-CM

## 2018-11-19 DIAGNOSIS — E46 PROTEIN MALNUTRITION (HCC): ICD-10-CM

## 2018-11-19 DIAGNOSIS — I10 ESSENTIAL HYPERTENSION: ICD-10-CM

## 2018-11-19 PROCEDURE — 99304 1ST NF CARE SF/LOW MDM 25: CPT | Performed by: INTERNAL MEDICINE

## 2018-11-27 ENCOUNTER — OFFICE VISIT (OUTPATIENT)
Dept: GERIATRIC MEDICINE | Age: 78
End: 2018-11-27
Payer: MEDICARE

## 2018-11-27 DIAGNOSIS — R29.6 FREQUENT FALLS: ICD-10-CM

## 2018-11-27 DIAGNOSIS — G44.209 TENSION-TYPE HEADACHE, NOT INTRACTABLE, UNSPECIFIED CHRONICITY PATTERN: ICD-10-CM

## 2018-11-27 DIAGNOSIS — I34.1 MITRAL PROLAPSE: ICD-10-CM

## 2018-11-27 DIAGNOSIS — J44.9 CHRONIC OBSTRUCTIVE PULMONARY DISEASE, UNSPECIFIED COPD TYPE (HCC): ICD-10-CM

## 2018-11-27 DIAGNOSIS — I10 ESSENTIAL HYPERTENSION: Primary | ICD-10-CM

## 2018-11-27 DIAGNOSIS — E03.9 HYPOTHYROIDISM, UNSPECIFIED TYPE: ICD-10-CM

## 2018-11-27 DIAGNOSIS — K21.9 GASTROESOPHAGEAL REFLUX DISEASE WITHOUT ESOPHAGITIS: ICD-10-CM

## 2018-11-27 PROCEDURE — 99315 NF DSCHRG MGMT 30 MIN/LESS: CPT | Performed by: NURSE PRACTITIONER

## 2018-11-29 VITALS
TEMPERATURE: 98 F | RESPIRATION RATE: 16 BRPM | SYSTOLIC BLOOD PRESSURE: 154 MMHG | HEART RATE: 79 BPM | DIASTOLIC BLOOD PRESSURE: 93 MMHG | OXYGEN SATURATION: 99 %

## 2018-11-30 NOTE — PROGRESS NOTES
Frannie Otero : 1940 DOS: 2018     Darling MAI HERNANDEZ    Seen for admission history and physical.    CHIEF COMPLAINT:  Weakness, neck pain, adult failure to thrive. This is a 53-year-old woman admitted here from [facility]. The patient has had ongoing headaches and neck pain. The patient has not had recent falls. The patient has been declining in ability to maintain her IADLs. The patient had some stiffness without paresthesias. The patient did undergo CT of head, was unremarkable. The patient did undergo lumbar puncture in the ER. The patient was stabilized. Cognition was fluctuant. The patient did not have evidence of acute infection or encephalitis. The patient was stabilized. She was seen by neurology while hospitalized. The patient was treated for acute encephalopathy. The patient did make gains, was seen by neurology. The patient has a history of Parkinson's and has been following with Dr. Any Suazo in the community prior to his moving. The patient's respiratory status was stabilized. The patient did make gains. Electrolytes showed no significant abnormalities. No evidence of acute elevated white count. The patient did make gains. The patient did have ongoing headaches, was felt to be contraction component of migraine. The patient did have acute exacerbation, was started on course of steroid burst.  The patient was stabilized, did make gains overall. She did undergo course of physical therapy, occupational therapy, and subsequently transferred here. Today, she is up in her wheelchair, pleasant, but she is immobile in the facility. She has not had any new chest pain or palpitation. Positive for headaches and negative for changes in vision, is globally weak. ASSESSMENT AND PLAN:  1. Intractable migraines:  The patient is clinically stable. Continue with our current interventions. I will have her on steroid taper.   2. Hypertension:  Blood pressure is stable. The patient has had elevated blood pressure Stable, may need titration in her regimen. 3.   Protein malnutritional:  The patient will benefit from increasing source. Continue supportive fait. We will monitor. Please note, available hospital records, imaging reports, consultant notes, laboratory results are reviewed. We will follow up as needed.          Electronically Signed By: You Sharma M.D. on 11/27/2018 13:13:23  ______________________________  You Sharma M.D. [id number]  D: 11/20/2018 14:43:01  T: 11/21/2018 12:55:50    cc: Shayy [address].

## 2018-12-01 PROBLEM — E03.9 HYPOTHYROIDISM: Status: ACTIVE | Noted: 2018-12-01

## 2018-12-01 PROBLEM — I10 ESSENTIAL HYPERTENSION: Status: ACTIVE | Noted: 2018-12-01

## 2018-12-01 PROBLEM — J44.9 CHRONIC OBSTRUCTIVE PULMONARY DISEASE (HCC): Status: ACTIVE | Noted: 2018-12-01

## 2021-01-18 ENCOUNTER — APPOINTMENT (OUTPATIENT)
Dept: GENERAL RADIOLOGY | Age: 81
End: 2021-01-18
Payer: MEDICARE

## 2021-01-18 ENCOUNTER — HOSPITAL ENCOUNTER (EMERGENCY)
Age: 81
Discharge: ANOTHER HOSPITAL | End: 2021-01-19
Attending: EMERGENCY MEDICINE
Payer: MEDICARE

## 2021-01-18 ENCOUNTER — APPOINTMENT (OUTPATIENT)
Dept: CT IMAGING | Age: 81
End: 2021-01-18
Payer: MEDICARE

## 2021-01-18 DIAGNOSIS — R45.850 HOMICIDAL BEHAVIOR: Primary | ICD-10-CM

## 2021-01-18 LAB
ACETAMINOPHEN LEVEL: <5 UG/ML (ref 10–30)
ALBUMIN SERPL-MCNC: 4.4 G/DL (ref 3.5–4.6)
ALP BLD-CCNC: 113 U/L (ref 40–130)
ALT SERPL-CCNC: 9 U/L (ref 0–33)
ANION GAP SERPL CALCULATED.3IONS-SCNC: 13 MEQ/L (ref 9–15)
APTT: 29.2 SEC (ref 24.4–36.8)
AST SERPL-CCNC: 28 U/L (ref 0–35)
BASOPHILS ABSOLUTE: 0 K/UL (ref 0–0.2)
BASOPHILS RELATIVE PERCENT: 0.3 %
BILIRUB SERPL-MCNC: <0.2 MG/DL (ref 0.2–0.7)
BUN BLDV-MCNC: 22 MG/DL (ref 8–23)
CALCIUM SERPL-MCNC: 9.4 MG/DL (ref 8.5–9.9)
CHLORIDE BLD-SCNC: 103 MEQ/L (ref 95–107)
CO2: 25 MEQ/L (ref 20–31)
CREAT SERPL-MCNC: 0.95 MG/DL (ref 0.5–0.9)
EKG ATRIAL RATE: 73 BPM
EKG P AXIS: 87 DEGREES
EKG P-R INTERVAL: 194 MS
EKG Q-T INTERVAL: 374 MS
EKG QRS DURATION: 88 MS
EKG QTC CALCULATION (BAZETT): 412 MS
EKG R AXIS: -36 DEGREES
EKG T AXIS: 65 DEGREES
EKG VENTRICULAR RATE: 73 BPM
EOSINOPHILS ABSOLUTE: 0.1 K/UL (ref 0–0.7)
EOSINOPHILS RELATIVE PERCENT: 0.6 %
ETHANOL PERCENT: NORMAL G/DL
ETHANOL: <10 MG/DL (ref 0–0.08)
GFR AFRICAN AMERICAN: >60
GFR NON-AFRICAN AMERICAN: 56.8
GLOBULIN: 2.5 G/DL (ref 2.3–3.5)
GLUCOSE BLD-MCNC: 109 MG/DL (ref 70–99)
HCT VFR BLD CALC: 36.5 % (ref 37–47)
HEMOGLOBIN: 12.2 G/DL (ref 12–16)
INR BLD: 1
LYMPHOCYTES ABSOLUTE: 0.6 K/UL (ref 1–4.8)
LYMPHOCYTES RELATIVE PERCENT: 7.1 %
MCH RBC QN AUTO: 31.2 PG (ref 27–31.3)
MCHC RBC AUTO-ENTMCNC: 33.5 % (ref 33–37)
MCV RBC AUTO: 93 FL (ref 82–100)
MONOCYTES ABSOLUTE: 0.7 K/UL (ref 0.2–0.8)
MONOCYTES RELATIVE PERCENT: 7.7 %
NEUTROPHILS ABSOLUTE: 7.6 K/UL (ref 1.4–6.5)
NEUTROPHILS RELATIVE PERCENT: 84.3 %
PDW BLD-RTO: 14.1 % (ref 11.5–14.5)
PLATELET # BLD: 132 K/UL (ref 130–400)
POTASSIUM REFLEX MAGNESIUM: 4.2 MEQ/L (ref 3.4–4.9)
PROTHROMBIN TIME: 13.4 SEC (ref 12.3–14.9)
RBC # BLD: 3.93 M/UL (ref 4.2–5.4)
REASON FOR REJECTION: NORMAL
REJECTED TEST: NORMAL
SALICYLATE, SERUM: <0.3 MG/DL (ref 15–30)
SARS-COV-2, NAAT: NOT DETECTED
SODIUM BLD-SCNC: 141 MEQ/L (ref 135–144)
TOTAL CK: 80 U/L (ref 0–170)
TOTAL PROTEIN: 6.9 G/DL (ref 6.3–8)
TROPONIN: <0.01 NG/ML (ref 0–0.01)
WBC # BLD: 9 K/UL (ref 4.8–10.8)

## 2021-01-18 PROCEDURE — 80053 COMPREHEN METABOLIC PANEL: CPT

## 2021-01-18 PROCEDURE — G0480 DRUG TEST DEF 1-7 CLASSES: HCPCS

## 2021-01-18 PROCEDURE — 80307 DRUG TEST PRSMV CHEM ANLYZR: CPT

## 2021-01-18 PROCEDURE — 71045 X-RAY EXAM CHEST 1 VIEW: CPT

## 2021-01-18 PROCEDURE — 85730 THROMBOPLASTIN TIME PARTIAL: CPT

## 2021-01-18 PROCEDURE — 72125 CT NECK SPINE W/O DYE: CPT

## 2021-01-18 PROCEDURE — 36415 COLL VENOUS BLD VENIPUNCTURE: CPT

## 2021-01-18 PROCEDURE — 85610 PROTHROMBIN TIME: CPT

## 2021-01-18 PROCEDURE — 84484 ASSAY OF TROPONIN QUANT: CPT

## 2021-01-18 PROCEDURE — 85025 COMPLETE CBC W/AUTO DIFF WBC: CPT

## 2021-01-18 PROCEDURE — 70450 CT HEAD/BRAIN W/O DYE: CPT

## 2021-01-18 PROCEDURE — 81001 URINALYSIS AUTO W/SCOPE: CPT

## 2021-01-18 PROCEDURE — 93005 ELECTROCARDIOGRAM TRACING: CPT | Performed by: EMERGENCY MEDICINE

## 2021-01-18 PROCEDURE — U0002 COVID-19 LAB TEST NON-CDC: HCPCS

## 2021-01-18 PROCEDURE — 99285 EMERGENCY DEPT VISIT HI MDM: CPT

## 2021-01-18 PROCEDURE — 82550 ASSAY OF CK (CPK): CPT

## 2021-01-18 ASSESSMENT — PAIN DESCRIPTION - PAIN TYPE: TYPE: CHRONIC PAIN

## 2021-01-18 ASSESSMENT — PAIN SCALES - GENERAL: PAINLEVEL_OUTOF10: 10

## 2021-01-18 ASSESSMENT — PAIN DESCRIPTION - LOCATION: LOCATION: GENERALIZED;BACK;NECK

## 2021-01-19 VITALS
DIASTOLIC BLOOD PRESSURE: 75 MMHG | WEIGHT: 112 LBS | HEIGHT: 59 IN | SYSTOLIC BLOOD PRESSURE: 167 MMHG | OXYGEN SATURATION: 99 % | RESPIRATION RATE: 22 BRPM | BODY MASS INDEX: 22.58 KG/M2 | TEMPERATURE: 98.6 F | HEART RATE: 66 BPM

## 2021-01-19 LAB
AMPHETAMINE SCREEN, URINE: NORMAL
BACTERIA: NEGATIVE /HPF
BARBITURATE SCREEN URINE: NORMAL
BENZODIAZEPINE SCREEN, URINE: NORMAL
BILIRUBIN URINE: NEGATIVE
BLOOD, URINE: NEGATIVE
CANNABINOID SCREEN URINE: NORMAL
CLARITY: CLEAR
COCAINE METABOLITE SCREEN URINE: NORMAL
COLOR: YELLOW
CRYSTALS, UA: ABNORMAL /HPF
EPITHELIAL CELLS, UA: ABNORMAL /HPF (ref 0–5)
FINE CASTS, UA: ABNORMAL /LPF (ref 0–5)
GLUCOSE URINE: NEGATIVE MG/DL
HYALINE CASTS: ABNORMAL /HPF (ref 0–5)
KETONES, URINE: NEGATIVE MG/DL
LEUKOCYTE ESTERASE, URINE: ABNORMAL
Lab: NORMAL
METHADONE SCREEN, URINE: NORMAL
NITRITE, URINE: NEGATIVE
OPIATE SCREEN URINE: NORMAL
OXYCODONE URINE: NORMAL
PH UA: 5 (ref 5–9)
PHENCYCLIDINE SCREEN URINE: NORMAL
PROPOXYPHENE SCREEN: NORMAL
PROTEIN UA: NEGATIVE MG/DL
RBC UA: ABNORMAL /HPF (ref 0–5)
SPECIFIC GRAVITY UA: 1.01 (ref 1–1.03)
UROBILINOGEN, URINE: 0.2 E.U./DL
WBC UA: ABNORMAL /HPF (ref 0–5)

## 2021-01-19 PROCEDURE — 93010 ELECTROCARDIOGRAM REPORT: CPT | Performed by: INTERNAL MEDICINE

## 2021-01-19 PROCEDURE — 6370000000 HC RX 637 (ALT 250 FOR IP): Performed by: EMERGENCY MEDICINE

## 2021-01-19 RX ORDER — AMLODIPINE BESYLATE 5 MG/1
5 TABLET ORAL ONCE
Status: COMPLETED | OUTPATIENT
Start: 2021-01-19 | End: 2021-01-19

## 2021-01-19 RX ORDER — ACETAMINOPHEN 325 MG/1
650 TABLET ORAL ONCE
Status: COMPLETED | OUTPATIENT
Start: 2021-01-19 | End: 2021-01-19

## 2021-01-19 RX ADMIN — CARBIDOPA AND LEVODOPA 1 TABLET: 25; 100 TABLET ORAL at 05:25

## 2021-01-19 RX ADMIN — AMLODIPINE BESYLATE 5 MG: 5 TABLET ORAL at 05:25

## 2021-01-19 RX ADMIN — ACETAMINOPHEN 650 MG: 325 TABLET, FILM COATED ORAL at 05:25

## 2021-01-19 NOTE — ED NOTES
Patient is resting on cart conversing with son. Behavior is calm and cooperative at this time. Respirations are even and non-labored. Skin is pink, warm, dry. Remain alert and oriented to name and birthdate. Patient is not oriented to day/date.   Patient is aware that she is at a hospital, but does not know the name of the hospital.     Mark Crowder RN  01/19/21 [address], RN  01/19/21 0074
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
## 2021-01-19 NOTE — ED NOTES
The pt has been accepted to [facility] room 104-2 under the care of Dr. Olegario Ramirez.  Nurse to nurse report can be called to [phone]     Vida Emanuel RN  01/19/21 2736

## 2021-01-19 NOTE — ED NOTES
Nurse updated that patient's son is requesting to speak with ER physician. Aida Macedo RN  01/19/21 0122

## 2021-01-19 NOTE — ED NOTES
Son in waiting area at this time. Discussed with son that patient behavior was calm and cooperative at this time and that admission to hospital or other psych facility was probable. Son relates that patient while at his home off and on has episodes of insomnia and hallucinations that involve patient chasing a cat or chasing a dog around his home and that he does not own either. Patient allowed to visit with mother at bedside at this time after this nurse consulted with Dr. Dawn Hurst. This nurse felt comfortable with allowing son to be at bedside.        Joaquina Luz RN  01/19/21 8869
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
## 2021-01-19 NOTE — ED NOTES
Urine specimen obtained via bedpan, labeled and sent to lab.      Maximilian Barajas RN  01/19/21 4610

## 2021-01-19 NOTE — ED NOTES
IV to saline lock. Patient tolerated well. Blood specimens obtained, labeled and sent to lab.      Kye Astorga RN  01/19/21 6357

## 2021-01-19 NOTE — ED NOTES
Return call from [address] [place] [address]. All information relayed to supervisor.          Lisa Mcgee RN  01/19/21 [facility] Linh Acosta RN  01/19/21 8661

## 2021-01-19 NOTE — ED NOTES
Son/Eric at patient bedside. This nurse asked son for contact information as per request of APS supervisor. Explanation to son of mandatory reporting mandate due to patient voiced claims. Son voices understanding of necessity for call to report. Belongings given to son to take home. Son left at this time due to having to be at work in a few hours.      Aryan Arnold, RN  01/19/21 [address] RN  01/19/21 [address] [facility] Alex Miller RN  01/19/21 7995
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
## 2021-01-19 NOTE — ED NOTES
Patient placed with [facility]. Kunal Chacon [facility] [address] [facility]  01/19/21 0190

## 2021-01-19 NOTE — ED NOTES
Consult with Dr Sid Betancourt, patient to be referred to [facility] for laury-psych placement. Enmanuel Kimble RN  01/19/21 9312
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
## 2021-01-21 LAB
CHOLESTEROL, TOTAL: 168 MG/DL (ref 0–199)
HDLC SERPL-MCNC: 61 MG/DL (ref 40–59)
LDL CHOLESTEROL CALCULATED: 94 MG/DL (ref 0–129)
TRIGL SERPL-MCNC: 64 MG/DL (ref 0–150)
TSH SERPL DL<=0.05 MIU/L-ACNC: 1.52 UIU/ML (ref 0.44–3.86)

## 2023-02-13 NOTE — PROGRESS NOTES
Physical Therapy  Facility/Department: [facility] EAZW W325/S118-66  Physical Therapy Discharge      NAME: Gavin Dalal    : 1940 (70 y.o.)  MRN: [id number]    Account: [de-identified]  Gender: female      Patient has been discharged from acute care hospital. DC patient from current PT program.      Electronically signed by Jeffry Batista PT on 11/15/18 at 12:23 PM Trigger finger (stenosing flexor tenosynovitis) is a catching or clicking of the finger (triggering) due to the formation of a soft tissue knot in the tendinous cable that moves your finger  When this knot of the tendon sheath passes through the A1 pulley at the bottom palmar side of your hand it becomes stock momentarily resulting in clicking or locking of the finger  There is no agreed upon cause for the formation of the knot but risk factors do include repetitive overuse and possibly prior trauma  Treatment involves avoidance of activities that trigger the finger, splinting with slight flexion at the knuckle (MCP joint) for 3-6 weeks or tessy taping  Oral anti-inflammatories also can help reduce swelling and triggering and are generally used for 2-4 weeks  If there is no relief with conservative measures or if there is severe locking then we will consider steroid injection once every 6 weeks for a maximum of 3 injections if there is no relief by 50%  Many patients, however, have great relief with only one injection and many (50%) have relief even up to 1 year  Surgery is considered if no relief after injection  (EDA Huertas 2018)  Educated risks of mixing NSAIDS ( (non-steroidal anti-inflammatory pills including advil, ibuprofen, motrin, meloxicam, celecoxib, aleve, naproxen, and aspirin containing products) with each other or with steroids (such as prednisone, medrol)  Explained risks of mixing these medications including stomach ulcer, severe internal bleeding, and kidney failure  Instructed not to take NSAIDS if have history of stomach ulcers, kidney issues, or uncontrolled hypertension  Instructed patient to use only one brand as prescribed  For naproxen, a maximum of 500 mg per dose every 12 hours and no more than two doses or 1,000mg per day  For Ibuprofen, a maximum of 800 mg per dose every 6 hours but no more than 3 doses or 2,400 mg per day  Never take these medications together   Never take these medications the same day  For severe pain and only if you have no liver problems, you may add Tylenol (also known as acetaminophen) maximum of 1,000  Mg per dose every 6 hours but no more 3 doses or 3,000 mg per day  Patient expressed understanding and agreed to plan